# Patient Record
Sex: MALE | Race: WHITE | NOT HISPANIC OR LATINO | Employment: OTHER | URBAN - METROPOLITAN AREA
[De-identification: names, ages, dates, MRNs, and addresses within clinical notes are randomized per-mention and may not be internally consistent; named-entity substitution may affect disease eponyms.]

---

## 2024-02-26 ENCOUNTER — OFFICE VISIT (OUTPATIENT)
Dept: PRIMARY CARE | Facility: CLINIC | Age: 69
End: 2024-02-26
Payer: MEDICARE

## 2024-02-26 VITALS
SYSTOLIC BLOOD PRESSURE: 132 MMHG | TEMPERATURE: 98.4 F | HEIGHT: 70 IN | WEIGHT: 280 LBS | BODY MASS INDEX: 40.09 KG/M2 | RESPIRATION RATE: 18 BRPM | HEART RATE: 68 BPM | DIASTOLIC BLOOD PRESSURE: 78 MMHG

## 2024-02-26 DIAGNOSIS — Z00.00 ROUTINE GENERAL MEDICAL EXAMINATION AT A HEALTH CARE FACILITY: ICD-10-CM

## 2024-02-26 DIAGNOSIS — Z87.891 PAST USE OF TOBACCO: ICD-10-CM

## 2024-02-26 DIAGNOSIS — Z13.6 SCREENING FOR AAA (AORTIC ABDOMINAL ANEURYSM): ICD-10-CM

## 2024-02-26 DIAGNOSIS — Z00.00 ROUTINE GENERAL MEDICAL EXAMINATION AT HEALTH CARE FACILITY: Primary | ICD-10-CM

## 2024-02-26 DIAGNOSIS — D69.6 THROMBOCYTOPENIA (CMS-HCC): ICD-10-CM

## 2024-02-26 DIAGNOSIS — Z12.5 ENCOUNTER FOR SCREENING FOR MALIGNANT NEOPLASM OF PROSTATE: ICD-10-CM

## 2024-02-26 DIAGNOSIS — Z12.11 ENCOUNTER FOR SCREENING FOR MALIGNANT NEOPLASM OF COLON: ICD-10-CM

## 2024-02-26 DIAGNOSIS — K21.9 GASTROESOPHAGEAL REFLUX DISEASE WITHOUT ESOPHAGITIS: ICD-10-CM

## 2024-02-26 DIAGNOSIS — I87.2 CHRONIC STASIS DERMATITIS: ICD-10-CM

## 2024-02-26 DIAGNOSIS — I89.0 LYMPHEDEMA OF BOTH LOWER EXTREMITIES: ICD-10-CM

## 2024-02-26 PROBLEM — Q82.0 HEREDITARY LYMPHEDEMA: Status: RESOLVED | Noted: 2024-02-26 | Resolved: 2024-02-26

## 2024-02-26 PROBLEM — Q82.0 HEREDITARY LYMPHEDEMA: Status: ACTIVE | Noted: 2024-02-26

## 2024-02-26 PROCEDURE — 1160F RVW MEDS BY RX/DR IN RCRD: CPT | Performed by: FAMILY MEDICINE

## 2024-02-26 PROCEDURE — 3008F BODY MASS INDEX DOCD: CPT | Performed by: FAMILY MEDICINE

## 2024-02-26 PROCEDURE — 1170F FXNL STATUS ASSESSED: CPT | Performed by: FAMILY MEDICINE

## 2024-02-26 PROCEDURE — 1036F TOBACCO NON-USER: CPT | Performed by: FAMILY MEDICINE

## 2024-02-26 PROCEDURE — 1158F ADVNC CARE PLAN TLK DOCD: CPT | Performed by: FAMILY MEDICINE

## 2024-02-26 PROCEDURE — G0439 PPPS, SUBSEQ VISIT: HCPCS | Performed by: FAMILY MEDICINE

## 2024-02-26 PROCEDURE — 1159F MED LIST DOCD IN RCRD: CPT | Performed by: FAMILY MEDICINE

## 2024-02-26 PROCEDURE — 1123F ACP DISCUSS/DSCN MKR DOCD: CPT | Performed by: FAMILY MEDICINE

## 2024-02-26 RX ORDER — OMEPRAZOLE 20 MG/1
20 TABLET, DELAYED RELEASE ORAL
COMMUNITY

## 2024-02-26 ASSESSMENT — ACTIVITIES OF DAILY LIVING (ADL)
MANAGING_FINANCES: INDEPENDENT
DOING_HOUSEWORK: INDEPENDENT
BATHING: INDEPENDENT
DRESSING: INDEPENDENT
TAKING_MEDICATION: INDEPENDENT
GROCERY_SHOPPING: INDEPENDENT

## 2024-02-26 ASSESSMENT — PATIENT HEALTH QUESTIONNAIRE - PHQ9
1. LITTLE INTEREST OR PLEASURE IN DOING THINGS: NOT AT ALL
SUM OF ALL RESPONSES TO PHQ9 QUESTIONS 1 AND 2: 0
2. FEELING DOWN, DEPRESSED OR HOPELESS: NOT AT ALL

## 2024-02-26 NOTE — ASSESSMENT & PLAN NOTE
He was treated in the lymphedema clinic with compression which was quite successful.  He is no longer using routine compression and has minimal edema although there is some leftover stasis dermatitis that is probably permanent

## 2024-02-26 NOTE — PROGRESS NOTES
"Subjective   Reason for Visit: Timbo Jean Baptiste is an 68 y.o. male here for a Medicare Wellness visit.     Past Medical, Surgical, and Family History reviewed and updated in chart.    Reviewed all medications by prescribing practitioner or clinical pharmacist (such as prescriptions, OTCs, herbal therapies and supplements) and documented in the medical record.    HPI    Patient Care Team:  Isaiah Huntley MD as PCP - General     Review of Systems    Objective   Vitals:  /78   Pulse 68   Temp 36.9 °C (98.4 °F)   Resp 18   Ht 1.765 m (5' 9.5\")   Wt 127 kg (280 lb)   BMI 40.76 kg/m²       Physical Exam  Constitutional:       Appearance: Normal appearance.   HENT:      Head: Normocephalic.   Eyes:      Conjunctiva/sclera: Conjunctivae normal.   Cardiovascular:      Rate and Rhythm: Normal rate and regular rhythm.      Heart sounds: Normal heart sounds.   Pulmonary:      Effort: Pulmonary effort is normal.      Breath sounds: Normal breath sounds.   Musculoskeletal:      Cervical back: Neck supple.      Right lower le+ Edema present.      Left lower le+ Edema present.   Skin:     General: Skin is warm and dry.   Neurological:      Mental Status: He is alert.         Assessment/Plan   Problem List Items Addressed This Visit       Chronic stasis dermatitis    GERD (gastroesophageal reflux disease)    Lymphedema of both lower extremities    Current Assessment & Plan     He was treated in the lymphedema clinic with compression which was quite successful.  He is no longer using routine compression and has minimal edema although there is some leftover stasis dermatitis that is probably permanent         Thrombocytopenia (CMS/HCC)    Current Assessment & Plan     This has never been symptomatic but has been noted to have mildly low platelets on prior screening blood work.  His last platelet count was 120.  He will continue annual screening         Relevant Orders    CBC and Auto Differential    Body mass " index (BMI) 40.0-44.9, adult (CMS/HCC)     Other Visit Diagnoses       Routine general medical examination at health care facility    -  Primary    Relevant Orders    1 Year Follow Up In [facility] - PCP - Wellness Exam    Encounter for screening for malignant neoplasm of colon        Relevant Orders    Colonoscopy Screening; Average Risk Patient    Screening for AAA (aortic abdominal aneurysm)        Relevant Orders    Vascular US Abdominal Aorta Aneurysm AAA Screening    Past use of tobacco        Relevant Orders    Vascular US Abdominal Aorta Aneurysm AAA Screening    Encounter for screening for malignant neoplasm of prostate        Relevant Orders    Prostate Specific Antigen    Routine general medical examination at a health care facility        Relevant Orders    Hepatitis C Antibody    Comprehensive Metabolic Panel    Lipid Panel        Advanced care planning was discussed with Timbo Jean Baptiste today. We reviewed code status, Medical Power of , and Living will.   He does have a living will and medical power of .  We discussed these documents in detail and he will get copies to be scanned into his chart.    He has a remote history of smoking which is just about 25 years.  We discussed the potential possibility of getting a low-dose lung CT but he did have a lung CT a few years ago which was completely normal.  He is asymptomatic at this point and is declining future lung screenings at this time.  We will however get his annual aortic aneurysm screening scheduled as well as his colonoscopy.  He is up-to-date on immunizations

## 2024-02-26 NOTE — ASSESSMENT & PLAN NOTE
This has never been symptomatic but has been noted to have mildly low platelets on prior screening blood work.  His last platelet count was 120.  He will continue annual screening

## 2024-02-27 ENCOUNTER — LAB (OUTPATIENT)
Dept: LAB | Facility: LAB | Age: 69
End: 2024-02-27
Payer: MEDICARE

## 2024-02-27 DIAGNOSIS — Z00.00 ROUTINE GENERAL MEDICAL EXAMINATION AT A HEALTH CARE FACILITY: ICD-10-CM

## 2024-02-27 DIAGNOSIS — Z12.5 ENCOUNTER FOR SCREENING FOR MALIGNANT NEOPLASM OF PROSTATE: ICD-10-CM

## 2024-02-27 DIAGNOSIS — D69.6 THROMBOCYTOPENIA (CMS-HCC): ICD-10-CM

## 2024-02-27 LAB
ALBUMIN SERPL BCP-MCNC: 4.5 G/DL (ref 3.4–5)
ALP SERPL-CCNC: 62 U/L (ref 33–136)
ALT SERPL W P-5'-P-CCNC: 23 U/L (ref 10–52)
ANION GAP SERPL CALC-SCNC: 12 MMOL/L (ref 10–20)
AST SERPL W P-5'-P-CCNC: 18 U/L (ref 9–39)
BASOPHILS # BLD AUTO: 0.02 X10*3/UL (ref 0–0.1)
BASOPHILS NFR BLD AUTO: 0.5 %
BILIRUB SERPL-MCNC: 0.9 MG/DL (ref 0–1.2)
BUN SERPL-MCNC: 17 MG/DL (ref 6–23)
CALCIUM SERPL-MCNC: 9.2 MG/DL (ref 8.6–10.3)
CHLORIDE SERPL-SCNC: 102 MMOL/L (ref 98–107)
CHOLEST SERPL-MCNC: 182 MG/DL (ref 0–199)
CHOLESTEROL/HDL RATIO: 5.5
CO2 SERPL-SCNC: 28 MMOL/L (ref 21–32)
CREAT SERPL-MCNC: 1.23 MG/DL (ref 0.5–1.3)
EGFRCR SERPLBLD CKD-EPI 2021: 64 ML/MIN/1.73M*2
EOSINOPHIL # BLD AUTO: 0.11 X10*3/UL (ref 0–0.7)
EOSINOPHIL NFR BLD AUTO: 2.9 %
ERYTHROCYTE [DISTWIDTH] IN BLOOD BY AUTOMATED COUNT: 12.3 % (ref 11.5–14.5)
GLUCOSE SERPL-MCNC: 117 MG/DL (ref 74–99)
HCT VFR BLD AUTO: 44.4 % (ref 41–52)
HCV AB SER QL: NONREACTIVE
HDLC SERPL-MCNC: 33 MG/DL
HGB BLD-MCNC: 15.2 G/DL (ref 13.5–17.5)
IMM GRANULOCYTES # BLD AUTO: 0.01 X10*3/UL (ref 0–0.7)
IMM GRANULOCYTES NFR BLD AUTO: 0.3 % (ref 0–0.9)
LDLC SERPL CALC-MCNC: 97 MG/DL
LYMPHOCYTES # BLD AUTO: 1.3 X10*3/UL (ref 1.2–4.8)
LYMPHOCYTES NFR BLD AUTO: 34.5 %
MCH RBC QN AUTO: 33.1 PG (ref 26–34)
MCHC RBC AUTO-ENTMCNC: 34.2 G/DL (ref 32–36)
MCV RBC AUTO: 97 FL (ref 80–100)
MONOCYTES # BLD AUTO: 0.51 X10*3/UL (ref 0.1–1)
MONOCYTES NFR BLD AUTO: 13.5 %
NEUTROPHILS # BLD AUTO: 1.82 X10*3/UL (ref 1.2–7.7)
NEUTROPHILS NFR BLD AUTO: 48.3 %
NON HDL CHOLESTEROL: 149 MG/DL (ref 0–149)
NRBC BLD-RTO: 0 /100 WBCS (ref 0–0)
PLATELET # BLD AUTO: 100 X10*3/UL (ref 150–450)
POTASSIUM SERPL-SCNC: 4.3 MMOL/L (ref 3.5–5.3)
PROT SERPL-MCNC: 6.7 G/DL (ref 6.4–8.2)
PSA SERPL-MCNC: 0.42 NG/ML
RBC # BLD AUTO: 4.59 X10*6/UL (ref 4.5–5.9)
SODIUM SERPL-SCNC: 138 MMOL/L (ref 136–145)
TRIGL SERPL-MCNC: 259 MG/DL (ref 0–149)
VLDL: 52 MG/DL (ref 0–40)
WBC # BLD AUTO: 3.8 X10*3/UL (ref 4.4–11.3)

## 2024-02-27 PROCEDURE — 80053 COMPREHEN METABOLIC PANEL: CPT

## 2024-02-27 PROCEDURE — 80061 LIPID PANEL: CPT

## 2024-02-27 PROCEDURE — 36415 COLL VENOUS BLD VENIPUNCTURE: CPT

## 2024-02-27 PROCEDURE — G0103 PSA SCREENING: HCPCS

## 2024-02-27 PROCEDURE — 86803 HEPATITIS C AB TEST: CPT

## 2024-02-27 PROCEDURE — 85025 COMPLETE CBC W/AUTO DIFF WBC: CPT

## 2024-02-28 DIAGNOSIS — Z12.11 COLON CANCER SCREENING: ICD-10-CM

## 2024-02-29 RX ORDER — POLYETHYLENE GLYCOL 3350, SODIUM CHLORIDE, SODIUM BICARBONATE, POTASSIUM CHLORIDE 420; 11.2; 5.72; 1.48 G/4L; G/4L; G/4L; G/4L
4000 POWDER, FOR SOLUTION ORAL ONCE
Qty: 4000 ML | Refills: 0 | Status: SHIPPED | OUTPATIENT
Start: 2024-02-29 | End: 2024-02-29

## 2024-03-13 ENCOUNTER — OFFICE VISIT (OUTPATIENT)
Dept: PRIMARY CARE | Facility: CLINIC | Age: 69
End: 2024-03-13
Payer: MEDICARE

## 2024-03-13 VITALS
RESPIRATION RATE: 16 BRPM | HEIGHT: 70 IN | TEMPERATURE: 97.7 F | DIASTOLIC BLOOD PRESSURE: 80 MMHG | SYSTOLIC BLOOD PRESSURE: 128 MMHG | HEART RATE: 76 BPM | WEIGHT: 280 LBS | BODY MASS INDEX: 40.09 KG/M2

## 2024-03-13 DIAGNOSIS — D69.6 THROMBOCYTOPENIA (CMS-HCC): Primary | ICD-10-CM

## 2024-03-13 DIAGNOSIS — R73.9 HYPERGLYCEMIA: ICD-10-CM

## 2024-03-13 LAB — POC HEMOGLOBIN A1C: 5.5 % (ref 4.2–6.5)

## 2024-03-13 PROCEDURE — 1123F ACP DISCUSS/DSCN MKR DOCD: CPT | Performed by: FAMILY MEDICINE

## 2024-03-13 PROCEDURE — 99213 OFFICE O/P EST LOW 20 MIN: CPT | Performed by: FAMILY MEDICINE

## 2024-03-13 PROCEDURE — 3008F BODY MASS INDEX DOCD: CPT | Performed by: FAMILY MEDICINE

## 2024-03-13 PROCEDURE — 1036F TOBACCO NON-USER: CPT | Performed by: FAMILY MEDICINE

## 2024-03-13 PROCEDURE — 83036 HEMOGLOBIN GLYCOSYLATED A1C: CPT | Performed by: FAMILY MEDICINE

## 2024-03-13 PROCEDURE — 1160F RVW MEDS BY RX/DR IN RCRD: CPT | Performed by: FAMILY MEDICINE

## 2024-03-13 PROCEDURE — 1159F MED LIST DOCD IN RCRD: CPT | Performed by: FAMILY MEDICINE

## 2024-03-13 NOTE — ASSESSMENT & PLAN NOTE
A review of the record indicates that he has had moderately low platelets for the last 4 years with a current level of 100.  The level is actually higher today than it has been in the past but he has never had severe thrombocytopenia.  This time he also has a very slightly decreased white blood cell count with a normal hemoglobin and hematocrit.  This may simply be etiopathic or autoimmune thrombocytopenia.  He also has a history of alcohol use and is possible he has undiagnosed liver disease however his liver enzymes are all normal.  I will refer him to hematology to pursue additional workup to uncover an underlying etiology of the low platelets and also to evaluate the low white blood cell count

## 2024-03-13 NOTE — PROGRESS NOTES
"Paul Jean Baptiste \"Saul\" is a 68 y.o. male who presents for Follow-up.  HPI      He is here today to discuss recent lab results.  Screening blood work showed a persistently low platelets, a slightly low white blood cell count, and a mildly elevated glucose.  He is not having any symptoms of bleeding now or in the past.    Visit Vitals  /80   Pulse 76   Temp 36.5 °C (97.7 °F)   Resp 16      Objective   Physical Exam  Constitutional:       Appearance: Normal appearance.   HENT:      Head: Normocephalic.   Eyes:      Conjunctiva/sclera: Conjunctivae normal.   Cardiovascular:      Rate and Rhythm: Normal rate and regular rhythm.      Heart sounds: Normal heart sounds.   Pulmonary:      Effort: Pulmonary effort is normal.      Breath sounds: Normal breath sounds.   Musculoskeletal:      Cervical back: Neck supple.   Skin:     General: Skin is warm and dry.   Neurological:      Mental Status: He is alert.       Assessment/Plan      Problem List Items Addressed This Visit       Thrombocytopenia (CMS/HCC) - Primary     A review of the record indicates that he has had moderately low platelets for the last 4 years with a current level of 100.  The level is actually higher today than it has been in the past but he has never had severe thrombocytopenia.  This time he also has a very slightly decreased white blood cell count with a normal hemoglobin and hematocrit.  This may simply be etiopathic or autoimmune thrombocytopenia.  He also has a history of alcohol use and is possible he has undiagnosed liver disease however his liver enzymes are all normal.  I will refer him to hematology to pursue additional workup to uncover an underlying etiology of the low platelets and also to evaluate the low white blood cell count         Relevant Orders    Referral to Benign Hematology     Other Visit Diagnoses       Hyperglycemia        Relevant Orders    POCT glycosylated hemoglobin (Hb A1C) manually resulted (Completed) "        Fortunately the A1c is in the normal range so he does not have diabetes or prediabetes today.

## 2024-03-20 ENCOUNTER — HOSPITAL ENCOUNTER (OUTPATIENT)
Dept: RADIOLOGY | Facility: CLINIC | Age: 69
Discharge: HOME | End: 2024-03-20
Payer: MEDICARE

## 2024-03-20 DIAGNOSIS — Z13.6 SCREENING FOR AAA (AORTIC ABDOMINAL ANEURYSM): ICD-10-CM

## 2024-03-20 DIAGNOSIS — Z87.891 PAST USE OF TOBACCO: ICD-10-CM

## 2024-03-20 PROCEDURE — 76706 US ABDL AORTA SCREEN AAA: CPT | Performed by: RADIOLOGY

## 2024-03-20 PROCEDURE — 76706 US ABDL AORTA SCREEN AAA: CPT

## 2024-04-19 ENCOUNTER — ANESTHESIA EVENT (OUTPATIENT)
Dept: GASTROENTEROLOGY | Facility: EXTERNAL LOCATION | Age: 69
End: 2024-04-19

## 2024-04-29 PROBLEM — J02.9 SORE THROAT: Status: ACTIVE | Noted: 2024-04-29

## 2024-04-29 PROBLEM — E78.5 HYPERLIPIDEMIA: Status: ACTIVE | Noted: 2024-04-29

## 2024-04-29 PROBLEM — M62.81 MUSCLE WEAKNESS: Status: ACTIVE | Noted: 2024-04-29

## 2024-04-29 PROBLEM — M25.673 STIFFNESS OF ANKLE JOINT: Status: ACTIVE | Noted: 2024-04-29

## 2024-04-29 PROBLEM — R06.2 WHEEZING: Status: ACTIVE | Noted: 2024-04-29

## 2024-04-29 PROBLEM — R73.9 HYPERGLYCEMIA: Status: ACTIVE | Noted: 2024-04-29

## 2024-04-29 PROBLEM — R60.0 EDEMA OF LOWER EXTREMITY: Status: ACTIVE | Noted: 2024-04-29

## 2024-04-29 PROBLEM — M79.606 PAIN OF LOWER EXTREMITY: Status: ACTIVE | Noted: 2024-04-29

## 2024-04-29 PROBLEM — R07.89 SENSATION OF CHEST TIGHTNESS: Status: ACTIVE | Noted: 2024-04-29

## 2024-04-30 ENCOUNTER — ANESTHESIA (OUTPATIENT)
Dept: GASTROENTEROLOGY | Facility: EXTERNAL LOCATION | Age: 69
End: 2024-04-30

## 2024-04-30 ENCOUNTER — HOSPITAL ENCOUNTER (OUTPATIENT)
Dept: GASTROENTEROLOGY | Facility: EXTERNAL LOCATION | Age: 69
Discharge: HOME | End: 2024-04-30
Payer: MEDICARE

## 2024-04-30 VITALS
WEIGHT: 280 LBS | SYSTOLIC BLOOD PRESSURE: 144 MMHG | OXYGEN SATURATION: 96 % | TEMPERATURE: 97.7 F | RESPIRATION RATE: 12 BRPM | DIASTOLIC BLOOD PRESSURE: 82 MMHG | BODY MASS INDEX: 40.09 KG/M2 | HEART RATE: 70 BPM | HEIGHT: 70 IN

## 2024-04-30 DIAGNOSIS — Z12.11 ENCOUNTER FOR SCREENING FOR MALIGNANT NEOPLASM OF COLON: Primary | ICD-10-CM

## 2024-04-30 PROCEDURE — 88305 TISSUE EXAM BY PATHOLOGIST: CPT

## 2024-04-30 PROCEDURE — 45385 COLONOSCOPY W/LESION REMOVAL: CPT | Performed by: INTERNAL MEDICINE

## 2024-04-30 PROCEDURE — 88305 TISSUE EXAM BY PATHOLOGIST: CPT | Performed by: STUDENT IN AN ORGANIZED HEALTH CARE EDUCATION/TRAINING PROGRAM

## 2024-04-30 RX ORDER — ONDANSETRON HYDROCHLORIDE 2 MG/ML
4 INJECTION, SOLUTION INTRAVENOUS ONCE AS NEEDED
Status: DISCONTINUED | OUTPATIENT
Start: 2024-04-30 | End: 2024-05-01 | Stop reason: HOSPADM

## 2024-04-30 RX ORDER — LIDOCAINE HYDROCHLORIDE 20 MG/ML
INJECTION, SOLUTION INFILTRATION; PERINEURAL AS NEEDED
Status: DISCONTINUED | OUTPATIENT
Start: 2024-04-30 | End: 2024-04-30

## 2024-04-30 RX ORDER — PROPOFOL 10 MG/ML
INJECTION, EMULSION INTRAVENOUS AS NEEDED
Status: DISCONTINUED | OUTPATIENT
Start: 2024-04-30 | End: 2024-04-30

## 2024-04-30 RX ORDER — SODIUM CHLORIDE 9 MG/ML
20 INJECTION, SOLUTION INTRAVENOUS CONTINUOUS
Status: DISCONTINUED | OUTPATIENT
Start: 2024-04-30 | End: 2024-05-01 | Stop reason: HOSPADM

## 2024-04-30 RX ADMIN — PROPOFOL 50 MG: 10 INJECTION, EMULSION INTRAVENOUS at 07:53

## 2024-04-30 RX ADMIN — PROPOFOL 100 MG: 10 INJECTION, EMULSION INTRAVENOUS at 07:35

## 2024-04-30 RX ADMIN — PROPOFOL 50 MG: 10 INJECTION, EMULSION INTRAVENOUS at 07:50

## 2024-04-30 RX ADMIN — PROPOFOL 50 MG: 10 INJECTION, EMULSION INTRAVENOUS at 07:39

## 2024-04-30 RX ADMIN — PROPOFOL 50 MG: 10 INJECTION, EMULSION INTRAVENOUS at 07:46

## 2024-04-30 RX ADMIN — LIDOCAINE HYDROCHLORIDE 60 MG: 20 INJECTION, SOLUTION INFILTRATION; PERINEURAL at 07:35

## 2024-04-30 RX ADMIN — PROPOFOL 50 MG: 10 INJECTION, EMULSION INTRAVENOUS at 07:42

## 2024-04-30 RX ADMIN — PROPOFOL 50 MG: 10 INJECTION, EMULSION INTRAVENOUS at 07:57

## 2024-04-30 SDOH — HEALTH STABILITY: MENTAL HEALTH: CURRENT SMOKER: 0

## 2024-04-30 ASSESSMENT — PAIN SCALES - GENERAL
PAIN_LEVEL: 0
PAINLEVEL_OUTOF10: 0 - NO PAIN

## 2024-04-30 ASSESSMENT — PAIN - FUNCTIONAL ASSESSMENT
PAIN_FUNCTIONAL_ASSESSMENT: 0-10

## 2024-04-30 ASSESSMENT — COLUMBIA-SUICIDE SEVERITY RATING SCALE - C-SSRS
1. IN THE PAST MONTH, HAVE YOU WISHED YOU WERE DEAD OR WISHED YOU COULD GO TO SLEEP AND NOT WAKE UP?: NO
2. HAVE YOU ACTUALLY HAD ANY THOUGHTS OF KILLING YOURSELF?: NO
6. HAVE YOU EVER DONE ANYTHING, STARTED TO DO ANYTHING, OR PREPARED TO DO ANYTHING TO END YOUR LIFE?: NO

## 2024-04-30 NOTE — ANESTHESIA POSTPROCEDURE EVALUATION
"Patient: Timbo Jean Baptiste \"Saul\"    Procedure Summary       Date: 04/30/24 Room / Location: [facility]    Anesthesia Start: 0731 Anesthesia Stop:     Procedure: COLONOSCOPY Diagnosis: Encounter for screening for malignant neoplasm of colon    Scheduled Providers: Oscar Wall MD Responsible Provider: GEORGETTE Flor    Anesthesia Type: MAC ASA Status: 3            Anesthesia Type: MAC    Vitals Value Taken Time   /63 04/30/24 0802   Temp 36.5 °C (97.7 °F) 04/30/24 0802   Pulse 74 04/30/24 0802   Resp 17 04/30/24 0802   SpO2 97 % 04/30/24 0802       Anesthesia Post Evaluation    Patient location during evaluation: bedside  Patient participation: complete - patient cannot participate  Level of consciousness: awake and responsive to verbal stimuli  Pain score: 0  Pain management: adequate  Airway patency: patent  Cardiovascular status: acceptable and hemodynamically stable  Respiratory status: acceptable  Hydration status: acceptable  Postoperative Nausea and Vomiting: none        No notable events documented.    "

## 2024-04-30 NOTE — ANESTHESIA PREPROCEDURE EVALUATION
"Patient: Timbo Jean Baptiste \"Saul\"    Procedure Information       Date/Time: 04/30/24 0730    Scheduled providers: Oscar Wall MD    Procedure: COLONOSCOPY    Location: [facility]            Relevant Problems   Cardiac   (+) Hyperlipidemia      GI   (+) GERD (gastroesophageal reflux disease)      Hematology   (+) Thrombocytopenia (CMS-HCC)       Clinical information reviewed:                   NPO Detail:  No data recorded     Physical Exam    Airway  Mallampati: II  TM distance: >3 FB  Neck ROM: full     Cardiovascular - normal exam     Dental - normal exam     Pulmonary - normal exam  Breath sounds clear to auscultation     Abdominal            Anesthesia Plan    History of general anesthesia?: yes  History of complications of general anesthesia?: no    ASA 3     MAC     The patient is not a current smoker.    intravenous induction   Anesthetic plan and risks discussed with patient.    Plan discussed with CRNA.      "

## 2024-04-30 NOTE — H&P
Outpatient Hospital Procedure H&P    Patient Profile-Procedures  Initial Info  Patient Demographics  Name Timbo Jean Baptiste  Date of Birth 1955  MRN [id number]  Address   [id number] [place] [id number] [facility] [address]    Primary Phone Number [phone]  Secondary Phone Number    Isaiah Mariano    Procedure(s):  Colonoscopy    Primary contact name and number   Extended Emergency Contact Information  Primary Emergency Contact: Shelbi Meza  Address: [address] [place] [id number] [facility]  Home Phone: [phone]  Mobile Phone: [phone]  Relation: Spouse    General Health  Weight   Vitals:    04/30/24 0723   Weight: 127 kg (280 lb)     BMI Body mass index is 40.76 kg/m².    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    Low HDL (under 40)    Localized edema 10/18/2016    Bilateral leg edema    Other specified abnormal findings of blood chemistry 10/24/2016    High thyroid stimulating hormone (TSH) level    Personal history of other specified conditions 12/16/2016    History of epistaxis    Visual impairment 02/2022    Vitamin D deficiency, unspecified 08/15/2019    Vitamin D deficiency    Vitamin D deficiency, unspecified 08/15/2019    Vitamin D deficiency    Vitamin D deficiency, unspecified 08/15/2019    Vitamin D deficiency       Provider assessment  Diagnosis: Screening    Medication Reviewed - yes  Prior to Admission medications    Medication Sig Start Date End Date Taking? Authorizing Provider   omeprazole OTC (PriLOSEC OTC) 20 mg EC tablet Take 1 tablet (20 mg) by mouth once daily in the morning. Take before meals. Do not crush, chew, or split.   Yes Historical Provider, MD       Physical Exam  Vitals:    04/30/24 0723   BP: (!) 161/92   Pulse: 68   Resp: 16   Temp: 36.3 °C (97.3 °F)   SpO2: 96%         General: A&Ox3, NAD.  CV: RRR. No murmur.  Resp: CTA bilaterally. No wheezing, rhonchi or rales.   Extrem: No edema.       Oropharyngeal Classification II (hard and soft palate, upper portion of tonsils and uvula visible)  ASA PS Classification 3  Sedation Plan Deep  Procedure Plan - pre-procedural (re)assesment completed by physician:  discharge/transfer patient when discharge criteria met    Oscar Wall MD  4/30/2024 7:31 AM

## 2024-04-30 NOTE — DISCHARGE INSTRUCTIONS
Patient Instructions Post Procedure      The anesthetics, sedatives or narcotics which were given to you today will be acting in your body for the next 24 hours, so you might feel a little sleepy or groggy.  This feeling should slowly wear off. Carefully read and follow the instructions.     You received sedation today:  - Do not drive or operate any machinery or power tools of any kind.   - No alcoholic beverages today, not even beer or wine.  - Do not make any important decisions or sign any legal documents.  - No over the counter medications that contain alcohol or that may cause drowsiness.    While it is common to experience mild to moderate abdominal distention, gas, or belching after your procedure, if any of these symptoms occur following discharge from the GI Lab or within one week of having your procedure, call the [facility] to be advised whether a visit to your nearest Urgent Care or Emergency Department is indicated.  Take this paper with you if you go.   - If you develop an allergic reaction to the medications that were given during your procedure such as difficulty breathing, rash, hives, severe nausea, vomiting or lightheadedness.  - If you experience chest pain, shortness of breath, severe abdominal pain, fevers and chills.  -If you develop signs and symptoms of bleeding such as blood in your spit, if your stools turn black, tarry, or bloody  - If you have not urinated within 8 hours following your procedure.  - If your IV site becomes painful, red, inflamed, or looks infected.    If you received a biopsy/polypectomy/sphincterotomy the following instructions apply below:  __ Do not use Aspirin containing products, non-steroidal medications or anti-coagulants for one week following your procedure. (Examples of these types of medications are: Advil, Arthrotec, Aleve, Coumadin, Ecotrin, Heparin, Ibuprofen, Indocin, Motrin, Naprosyn, Nuprin, Plavix, Vioxx, and Voltarin, or their generic  forms.  This list is not all-inclusive.  Check with your physician or pharmacist before resuming medications.)   __ Eat a soft diet today.  Avoid foods that are poorly digested for the next 24 hours.  These foods would include: nuts, beans, lettuce, red meats, and fried foods. Start with liquids and advance your diet as tolerated, gradually work up to eating solids.   __ Do not have a Barium Study or Enema for one week.    Your physician recommends the additional following instructions:    -You have a contact number available for emergencies. The signs and symptoms of potential delayed complications were discussed with you. You may return to normal activities tomorrow.  -Resume your previous diet or other if specified.  -Continue your present medications.   -We are waiting for your pathology results, if applicable.  -The findings and recommendations have been discussed with you and/or family.  - Please see Medication Reconciliation Form for new medication/medications prescribed.     If you experience any problems or have any questions following discharge from the GI Lab, please call: [phone] from 7 am- 4:30 pm.  In the event of an emergency please go to the closest Emergency Department or call Dr. Wall at [phone]

## 2024-05-08 LAB
LABORATORY COMMENT REPORT: NORMAL
PATH REPORT.FINAL DX SPEC: NORMAL
PATH REPORT.GROSS SPEC: NORMAL
PATH REPORT.TOTAL CANCER: NORMAL

## 2024-07-03 ENCOUNTER — OFFICE VISIT (OUTPATIENT)
Dept: HEMATOLOGY/ONCOLOGY | Facility: CLINIC | Age: 69
End: 2024-07-03
Payer: MEDICARE

## 2024-07-03 ENCOUNTER — LAB (OUTPATIENT)
Dept: LAB | Facility: CLINIC | Age: 69
End: 2024-07-03
Payer: MEDICARE

## 2024-07-03 VITALS
WEIGHT: 283.07 LBS | OXYGEN SATURATION: 96 % | RESPIRATION RATE: 16 BRPM | HEIGHT: 69 IN | DIASTOLIC BLOOD PRESSURE: 80 MMHG | BODY MASS INDEX: 41.93 KG/M2 | SYSTOLIC BLOOD PRESSURE: 130 MMHG | TEMPERATURE: 97.7 F | HEART RATE: 71 BPM

## 2024-07-03 DIAGNOSIS — D72.819 LEUKOPENIA, UNSPECIFIED TYPE: Primary | ICD-10-CM

## 2024-07-03 DIAGNOSIS — D69.6 THROMBOCYTOPENIA (CMS-HCC): ICD-10-CM

## 2024-07-03 DIAGNOSIS — D72.819 LEUKOPENIA, UNSPECIFIED TYPE: ICD-10-CM

## 2024-07-03 LAB
ALBUMIN SERPL BCP-MCNC: 4.4 G/DL (ref 3.4–5)
ALP SERPL-CCNC: 61 U/L (ref 33–136)
ALT SERPL W P-5'-P-CCNC: 18 U/L (ref 10–52)
ANION GAP SERPL CALC-SCNC: 12 MMOL/L (ref 10–20)
APTT PPP: 32 SECONDS (ref 27–38)
AST SERPL W P-5'-P-CCNC: 17 U/L (ref 9–39)
B2 GLYCOPROT1 IGA SER-ACNC: <0.6 U/ML
B2 GLYCOPROT1 IGG SER-ACNC: <1.4 U/ML
B2 GLYCOPROT1 IGM SER-ACNC: <0.2 U/ML
BASOPHILS # BLD AUTO: 0.02 X10*3/UL (ref 0–0.1)
BASOPHILS NFR BLD AUTO: 0.6 %
BILIRUB SERPL-MCNC: 1.1 MG/DL (ref 0–1.2)
BUN SERPL-MCNC: 20 MG/DL (ref 6–23)
CALCIUM SERPL-MCNC: 9.4 MG/DL (ref 8.6–10.3)
CARDIOLIPIN IGA SERPL-ACNC: 0.5 APL U/ML
CARDIOLIPIN IGG SER IA-ACNC: <1.6 GPL U/ML
CARDIOLIPIN IGM SER IA-ACNC: <0.2 MPL U/ML
CHLORIDE SERPL-SCNC: 100 MMOL/L (ref 98–107)
CMV IGG AVIDITY SERPL IA-RTO: NONREACTIVE %
CO2 SERPL-SCNC: 29 MMOL/L (ref 21–32)
CREAT SERPL-MCNC: 1.15 MG/DL (ref 0.5–1.3)
CRP SERPL-MCNC: 0.8 MG/DL
EBV EA IGG SER QL: NEGATIVE
EBV NA AB SER QL: POSITIVE
EBV VCA IGG SER IA-ACNC: POSITIVE
EBV VCA IGM SER IA-ACNC: NEGATIVE
EGFRCR SERPLBLD CKD-EPI 2021: 69 ML/MIN/1.73M*2
EOSINOPHIL # BLD AUTO: 0.08 X10*3/UL (ref 0–0.7)
EOSINOPHIL NFR BLD AUTO: 2.3 %
ERYTHROCYTE [DISTWIDTH] IN BLOOD BY AUTOMATED COUNT: 12.2 % (ref 11.5–14.5)
ERYTHROCYTE [SEDIMENTATION RATE] IN BLOOD BY WESTERGREN METHOD: 5 MM/H (ref 0–20)
FIBRINOGEN PPP-MCNC: 341 MG/DL (ref 200–400)
FOLATE SERPL-MCNC: 16.6 NG/ML
GLUCOSE SERPL-MCNC: 125 MG/DL (ref 74–99)
HBV CORE IGM SER QL: NONREACTIVE
HBV SURFACE AG SERPL QL IA: NONREACTIVE
HCT VFR BLD AUTO: 40.5 % (ref 41–52)
HGB BLD-MCNC: 14.1 G/DL (ref 13.5–17.5)
HIV 1+2 AB+HIV1 P24 AG SERPL QL IA: NONREACTIVE
IGA SERPL-MCNC: 67 MG/DL (ref 70–400)
IGG SERPL-MCNC: 826 MG/DL (ref 700–1600)
IGM SERPL-MCNC: 48 MG/DL (ref 40–230)
IMM GRANULOCYTES # BLD AUTO: 0.01 X10*3/UL (ref 0–0.7)
IMM GRANULOCYTES NFR BLD AUTO: 0.3 % (ref 0–0.9)
INR PPP: 1.1 (ref 0.9–1.1)
LDH SERPL L TO P-CCNC: 162 U/L (ref 84–246)
LYMPHOCYTES # BLD AUTO: 0.79 X10*3/UL (ref 1.2–4.8)
LYMPHOCYTES NFR BLD AUTO: 23.1 %
MCH RBC QN AUTO: 32.8 PG (ref 26–34)
MCHC RBC AUTO-ENTMCNC: 34.8 G/DL (ref 32–36)
MCV RBC AUTO: 94 FL (ref 80–100)
MONOCYTES # BLD AUTO: 0.38 X10*3/UL (ref 0.1–1)
MONOCYTES NFR BLD AUTO: 11.1 %
NEUTROPHILS # BLD AUTO: 2.14 X10*3/UL (ref 1.2–7.7)
NEUTROPHILS NFR BLD AUTO: 62.6 %
PLATELET # BLD AUTO: 89 X10*3/UL (ref 150–450)
POTASSIUM SERPL-SCNC: 4 MMOL/L (ref 3.5–5.3)
PROT SERPL-MCNC: 6.4 G/DL (ref 6.4–8.2)
PROT SERPL-MCNC: 6.7 G/DL (ref 6.4–8.2)
PROTHROMBIN TIME: 11.9 SECONDS (ref 9.8–12.8)
RBC # BLD AUTO: 4.3 X10*6/UL (ref 4.5–5.9)
RHEUMATOID FACT SER NEPH-ACNC: <10 IU/ML (ref 0–15)
SODIUM SERPL-SCNC: 137 MMOL/L (ref 136–145)
VIT B12 SERPL-MCNC: 235 PG/ML (ref 211–911)
WBC # BLD AUTO: 3.4 X10*3/UL (ref 4.4–11.3)

## 2024-07-03 PROCEDURE — 82746 ASSAY OF FOLIC ACID SERUM: CPT | Performed by: PHYSICIAN ASSISTANT

## 2024-07-03 PROCEDURE — 85384 FIBRINOGEN ACTIVITY: CPT | Performed by: PHYSICIAN ASSISTANT

## 2024-07-03 PROCEDURE — 86147 CARDIOLIPIN ANTIBODY EA IG: CPT | Performed by: PHYSICIAN ASSISTANT

## 2024-07-03 PROCEDURE — 83521 IG LIGHT CHAINS FREE EACH: CPT | Performed by: PHYSICIAN ASSISTANT

## 2024-07-03 PROCEDURE — 1126F AMNT PAIN NOTED NONE PRSNT: CPT | Performed by: PHYSICIAN ASSISTANT

## 2024-07-03 PROCEDURE — 86023 IMMUNOGLOBULIN ASSAY: CPT

## 2024-07-03 PROCEDURE — 82607 VITAMIN B-12: CPT | Performed by: PHYSICIAN ASSISTANT

## 2024-07-03 PROCEDURE — 99214 OFFICE O/P EST MOD 30 MIN: CPT | Performed by: PHYSICIAN ASSISTANT

## 2024-07-03 PROCEDURE — 86146 BETA-2 GLYCOPROTEIN ANTIBODY: CPT | Performed by: PHYSICIAN ASSISTANT

## 2024-07-03 PROCEDURE — 1036F TOBACCO NON-USER: CPT | Performed by: PHYSICIAN ASSISTANT

## 2024-07-03 PROCEDURE — 84155 ASSAY OF PROTEIN SERUM: CPT | Mod: 59 | Performed by: PHYSICIAN ASSISTANT

## 2024-07-03 PROCEDURE — 86665 EPSTEIN-BARR CAPSID VCA: CPT | Performed by: PHYSICIAN ASSISTANT

## 2024-07-03 PROCEDURE — 83010 ASSAY OF HAPTOGLOBIN QUANT: CPT | Performed by: PHYSICIAN ASSISTANT

## 2024-07-03 PROCEDURE — 86644 CMV ANTIBODY: CPT | Performed by: PHYSICIAN ASSISTANT

## 2024-07-03 PROCEDURE — 1123F ACP DISCUSS/DSCN MKR DOCD: CPT | Performed by: PHYSICIAN ASSISTANT

## 2024-07-03 PROCEDURE — 86038 ANTINUCLEAR ANTIBODIES: CPT | Performed by: PHYSICIAN ASSISTANT

## 2024-07-03 PROCEDURE — 87389 HIV-1 AG W/HIV-1&-2 AB AG IA: CPT | Performed by: PHYSICIAN ASSISTANT

## 2024-07-03 PROCEDURE — 85613 RUSSELL VIPER VENOM DILUTED: CPT | Performed by: PHYSICIAN ASSISTANT

## 2024-07-03 PROCEDURE — 1159F MED LIST DOCD IN RCRD: CPT | Performed by: PHYSICIAN ASSISTANT

## 2024-07-03 PROCEDURE — 86431 RHEUMATOID FACTOR QUANT: CPT | Performed by: PHYSICIAN ASSISTANT

## 2024-07-03 PROCEDURE — 3008F BODY MASS INDEX DOCD: CPT | Performed by: PHYSICIAN ASSISTANT

## 2024-07-03 PROCEDURE — 82784 ASSAY IGA/IGD/IGG/IGM EACH: CPT | Performed by: PHYSICIAN ASSISTANT

## 2024-07-03 PROCEDURE — 85652 RBC SED RATE AUTOMATED: CPT | Performed by: PHYSICIAN ASSISTANT

## 2024-07-03 PROCEDURE — 86663 EPSTEIN-BARR ANTIBODY: CPT | Performed by: PHYSICIAN ASSISTANT

## 2024-07-03 PROCEDURE — 88189 FLOWCYTOMETRY/READ 16 & >: CPT | Performed by: PHYSICIAN ASSISTANT

## 2024-07-03 PROCEDURE — 88185 FLOWCYTOMETRY/TC ADD-ON: CPT | Mod: TC | Performed by: PHYSICIAN ASSISTANT

## 2024-07-03 PROCEDURE — 86140 C-REACTIVE PROTEIN: CPT | Performed by: PHYSICIAN ASSISTANT

## 2024-07-03 PROCEDURE — 85610 PROTHROMBIN TIME: CPT | Performed by: PHYSICIAN ASSISTANT

## 2024-07-03 PROCEDURE — 87340 HEPATITIS B SURFACE AG IA: CPT | Performed by: PHYSICIAN ASSISTANT

## 2024-07-03 PROCEDURE — 84165 PROTEIN E-PHORESIS SERUM: CPT | Performed by: PHYSICIAN ASSISTANT

## 2024-07-03 PROCEDURE — 85730 THROMBOPLASTIN TIME PARTIAL: CPT | Performed by: PHYSICIAN ASSISTANT

## 2024-07-03 PROCEDURE — 81450 HL NEO GSAP 5-50DNA/DNA&RNA: CPT | Performed by: PHYSICIAN ASSISTANT

## 2024-07-03 PROCEDURE — 84075 ASSAY ALKALINE PHOSPHATASE: CPT

## 2024-07-03 PROCEDURE — 84630 ASSAY OF ZINC: CPT

## 2024-07-03 PROCEDURE — 99204 OFFICE O/P NEW MOD 45 MIN: CPT | Performed by: PHYSICIAN ASSISTANT

## 2024-07-03 PROCEDURE — 83615 LACTATE (LD) (LDH) ENZYME: CPT | Performed by: PHYSICIAN ASSISTANT

## 2024-07-03 PROCEDURE — 82525 ASSAY OF COPPER: CPT

## 2024-07-03 PROCEDURE — 86645 CMV ANTIBODY IGM: CPT

## 2024-07-03 PROCEDURE — 86664 EPSTEIN-BARR NUCLEAR ANTIGEN: CPT | Performed by: PHYSICIAN ASSISTANT

## 2024-07-03 PROCEDURE — 86705 HEP B CORE ANTIBODY IGM: CPT | Performed by: PHYSICIAN ASSISTANT

## 2024-07-03 PROCEDURE — 85025 COMPLETE CBC W/AUTO DIFF WBC: CPT

## 2024-07-03 PROCEDURE — 36415 COLL VENOUS BLD VENIPUNCTURE: CPT

## 2024-07-03 PROCEDURE — 1160F RVW MEDS BY RX/DR IN RCRD: CPT | Performed by: PHYSICIAN ASSISTANT

## 2024-07-03 ASSESSMENT — ENCOUNTER SYMPTOMS
NAUSEA: 0
ENDOCRINE NEGATIVE: 1
SORE THROAT: 0
CHEST TIGHTNESS: 0
LIGHT-HEADEDNESS: 0
DIARRHEA: 0
CHILLS: 0
APPETITE CHANGE: 0
LEG SWELLING: 1
PSYCHIATRIC NEGATIVE: 1
PALPITATIONS: 0
ADENOPATHY: 0
FATIGUE: 0
ABDOMINAL PAIN: 0
FEVER: 0
COUGH: 0
BLOOD IN STOOL: 0
DIZZINESS: 0
SHORTNESS OF BREATH: 0
ARTHRALGIAS: 0
BRUISES/BLEEDS EASILY: 0
HEMATURIA: 0
UNEXPECTED WEIGHT CHANGE: 0
MYALGIAS: 0
EYES NEGATIVE: 1
VOMITING: 0
CONSTIPATION: 0

## 2024-07-03 ASSESSMENT — PAIN SCALES - GENERAL: PAINLEVEL: 0-NO PAIN

## 2024-07-03 NOTE — PROGRESS NOTES
"Patient ID: Timbo Jean Baptiste \"Saul\" is a 68 y.o. male.  Referring Physician: Isaiah Huntley MD  [id number] [id number]  Primary Care Provider: Isaiah Huntley MD  Visit Type: Initial Visit    Location: [facility]  Diagnosis/Reason:   Thrombocytopenia  Leukopenia    HPI:  Timbo Jean Baptiste is a 68 y.o. male referred for consultation of thrombocytopenia & leukopenia.     NOTE:  7/3/24 - Patient has medical history significant for chronic stasis dermatitis, GERD.    Per review of records:  Platelets low dating back to Aug of 2019 ranging from .   Leukocytopenia in Feb 2022 and 2024 - 4.3 and 3.8   RBCs, hemoglobin and hematocrit and differentials WNL   Hep C negative   CMP WNL     Previous Hematological Background:  Hx of hematological disorders: No - Patient denies prior hematologic history Told by PCP in the past, about 4-5 years ago that his platelets were low. Monitored- never worked up.    Hx of blood transfusions: No - Patient denies prior blood transfusion  Hx of iron supplementation: No - Denies any previous supplementation  Hx of B12 supplementation: No - Denies any prior supplementation  Hx of folate supplementation: No - Denies any prior supplementation    Relevant medications:  Omeprazole PPI only   Currently using NSAID's (Naproxen, Ibuprofen etc.): Rarely  Currently taking any supplements: Denies    Feels well. No concerns. Some black stools periodically - recently has colonoscopy. Epitaxis from left nares in the winter - takes about 5 mins to clot, has has it cauterized in the past. No other bleeding. Has right upper abd cramping pain, describes as MSK pain, happens a couple times a week, relief with rest. He has had lymphedema in her bilateral lower extremities for years, has gone to PT, uses pumps and compression socks.     Patient denies weight loss, abnormal bruising and bleeding, hematuria, blood in stool, oral/gingival bleeding, lymphadenopathy, recurrent " "infections, recurrent fevers, night sweats, early satiety, abdominal pain, bone pain, chest pain, palpitations, SOB, TRINIDAD, fatigue, dizziness, lightheadedness, PICA.    PMHx:  Active Ambulatory Problems     Diagnosis Date Noted    Chronic stasis dermatitis 02/26/2024    GERD (gastroesophageal reflux disease) 02/26/2024    Lymphedema of both lower extremities 02/26/2024    Thrombocytopenia (CMS-HCC) 02/26/2024    Body mass index (BMI) 40.0-44.9, adult (Multi) 02/26/2024    Hyperglycemia 04/29/2024    Hyperlipidemia 04/29/2024    Edema of lower extremity 04/29/2024    Muscle weakness 04/29/2024    Pain of lower extremity 04/29/2024    Sensation of chest tightness 04/29/2024    Sore throat 04/29/2024    Stiffness of ankle joint 04/29/2024    Wheezing 04/29/2024     Resolved Ambulatory Problems     Diagnosis Date Noted    Hereditary lymphedema 02/26/2024     Past Medical History:   Diagnosis Date    Cataract 09/2022    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   Father: prostate cancer     Social Hx:  Timbo Jean Baptiste \"Saul\"    reports that he quit smoking about 23 years ago. His smoking use included cigarettes. He started smoking about 49 years ago. He has a 26 pack-year smoking history. He has never used smokeless tobacco.  He  reports current alcohol use of about 4.0 standard drinks of alcohol per week.  He  reports no history of drug use.  Social History "     Socioeconomic History    Marital status:      Spouse name: None    Number of children: None    Years of education: None    Highest education level: None   Occupational History    None   Tobacco Use    Smoking status: Former     Current packs/day: 0.00     Average packs/day: 1 pack/day for 26.0 years (26.0 ttl pk-yrs)     Types: Cigarettes     Start date: 1974     Quit date: 2000     Years since quittin.9    Smokeless tobacco: Never   Vaping Use    Vaping status: Never Used   Substance and Sexual Activity    Alcohol use: Yes     Alcohol/week: 4.0 standard drinks of alcohol     Types: 4 Cans of beer per week    Drug use: Never    Sexual activity: Not Currently     Partners: Female     Birth control/protection: Abstinence   Other Topics Concern    None   Social History Narrative    None     Social Determinants of Health     Financial Resource Strain: Not on file   Food Insecurity: Not on file   Transportation Needs: Not on file   Physical Activity: Not on file   Stress: Not on file   Social Connections: Not on file   Intimate Partner Violence: Not on file   Housing Stability: Not on file      Living Situation: with spouse   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    ROS:  Review of Systems   Constitutional:  Negative for appetite change, chills, fatigue,  "fever and unexpected weight change.   HENT:   Positive for nosebleeds. Negative for lump/mass and sore throat.    Eyes: Negative.    Respiratory:  Negative for chest tightness, cough and shortness of breath.    Cardiovascular:  Positive for leg swelling. Negative for chest pain and palpitations.   Gastrointestinal:  Negative for abdominal pain, blood in stool, constipation, diarrhea, nausea and vomiting.   Endocrine: Negative.    Genitourinary:  Negative for hematuria.    Musculoskeletal:  Negative for arthralgias and myalgias.   Skin: Negative.    Neurological:  Negative for dizziness and light-headedness.   Hematological:  Negative for adenopathy. Does not bruise/bleed easily.   Psychiatric/Behavioral: Negative.        10 point review of systems negative except as state in HPI.    Vitals & Statistics:  Objective   BSA: 2.5 meters squared  /80 (BP Location: Right arm, Patient Position: Sitting, BP Cuff Size: Adult)   Pulse 71   Temp 36.5 °C (97.7 °F) (Temporal)   Resp 16   Ht 1.763 m (5' 9.41\")   Wt 128 kg (283 lb 1.1 oz)   SpO2 96%   BMI 41.31 kg/m²     Physical Exam:  Physical Exam  Vitals reviewed.   Constitutional:       General: He is not in acute distress.     Appearance: He is obese. He is not toxic-appearing.   HENT:      Head: Normocephalic and atraumatic.      Nose: Nose normal.      Mouth/Throat:      Mouth: Mucous membranes are moist.      Pharynx: Oropharynx is clear.   Eyes:      Pupils: Pupils are equal, round, and reactive to light.   Cardiovascular:      Rate and Rhythm: Normal rate and regular rhythm.      Pulses: Normal pulses.      Heart sounds: Normal heart sounds. No murmur heard.  Pulmonary:      Effort: Pulmonary effort is normal. No respiratory distress.      Breath sounds: Normal breath sounds.   Abdominal:      General: Bowel sounds are normal. There is no distension.      Palpations: Abdomen is soft. There is no mass.      Tenderness: There is no abdominal tenderness. There " "is no guarding.   Musculoskeletal:         General: Normal range of motion.      Cervical back: Normal range of motion and neck supple.      Right lower leg: Edema present.      Left lower leg: Edema present.   Lymphadenopathy:      Head:      Right side of head: No submental, submandibular, tonsillar, preauricular or posterior auricular adenopathy.      Left side of head: No submental, submandibular, tonsillar, preauricular or posterior auricular adenopathy.      Cervical: No cervical adenopathy.      Right cervical: No superficial or posterior cervical adenopathy.     Left cervical: No superficial or posterior cervical adenopathy.      Upper Body:      Right upper body: No supraclavicular or axillary adenopathy.      Left upper body: No supraclavicular or axillary adenopathy.   Skin:     General: Skin is warm and dry.      Capillary Refill: Capillary refill takes less than 2 seconds.   Neurological:      General: No focal deficit present.      Mental Status: He is alert and oriented to person, place, and time.   Psychiatric:         Mood and Affect: Mood normal.         Behavior: Behavior normal.         Thought Content: Thought content normal.         Judgment: Judgment normal.       Results:  Lab Results   Component Value Date    WBC 3.8 (L) 02/27/2024    NEUTROABS 1.82 02/27/2024    IGABSOL 0.01 02/27/2024    LYMPHSABS 1.30 02/27/2024    MONOSABS 0.51 02/27/2024    EOSABS 0.11 02/27/2024    BASOSABS 0.02 02/27/2024    RBC 4.59 02/27/2024    MCV 97 02/27/2024    MCHC 34.2 02/27/2024    HGB 15.2 02/27/2024    HCT 44.4 02/27/2024     (L) 02/27/2024     No results found for: \"RETICCTPCT\"   Lab Results   Component Value Date    CREATININE 1.23 02/27/2024    BUN 17 02/27/2024    EGFR 64 02/27/2024     02/27/2024    K 4.3 02/27/2024     02/27/2024    CO2 28 02/27/2024      Lab Results   Component Value Date    ALT 23 02/27/2024    AST 18 02/27/2024    ALKPHOS 62 02/27/2024    BILITOT 0.9 02/27/2024 " "     Lab Results   Component Value Date    TSH 3.51 2019     Lab Results   Component Value Date    TSH 3.51 2019     No results found for: \"IRON\", \"TIBC\", \"FERRITIN\"   No results found for: \"HBRJFSCA08\"   No results found for: \"FOLATE\"  No results found for: \"OLGA\", \"RF\", \"SEDRATE\"   No results found for: \"CRP\"   No results found for: \"KAMI\"  No results found for: \"LDH\"  No results found for: \"HAPTOGLOBIN\"  No results found for: \"SPEP\"  No results found for: \"IGG\", \"IGM\", \"IGA\"  Lab Results   Component Value Date    HEPCAB Nonreactive 2024     No results found for: \"HIV1X2\"    Assessment:  Timbo Jean Baptiste is a 68 y.o. male referred for consultation of thrombocytopenia & leukopenia.     He is asymptomatic w/ unremarkable physical exam    I reviewed patient's chart including but not limited to labs, imaging, surgical/procedure notes, pathology, hospital notes, doctor's notes.    Relevant historical labs/imagin24  HCV: Negative    7/3/24 results:  Leukopenia at 3.4 - Lymphopenia at 0.79  NC/NC erythrocytopenia w/ RBC count at 4.30 - Hct low, Hb WNL - RDW WNL  Thrombocytopenia at 89K  Remaining results pending at time of writing    Plan:  Discussed seeking medical attention if became sick d/t low wbc counts. To call us sooner for appt if starts getting B symptoms, recurrent infections or lumps.  Discussed possible etiologies of leukopenia including: vitamin deficiency, autoimmune disease, infection, inflammatory disorder, allergies, chronic benign leukopenia, malignancy, etc.   Discussed possible etiologies of thrombocytopenia including: vitamin deficiency, enlarged spleen/liver, autoimmune disease, infection, inflammatory disorder, allergies, collection tube clumping, etc. Will start hematological workup today.    1) Leukopenia  2) Thrombocytopenia  Lab results pending - Will review when available and address adverse results as needed  RTC in 3 weeks via in-person visit - F/U sooner if " needed/urgent    I had an extensive discussion with the patient regarding the diagnosis and discussed the plan of therapy, including general considerations regarding side effects and outcomes. Pt understood and gave appropriate teach back about the plan of care. All questions were answered to the patient's satisfaction. The patient is instructed to contact us at any time if questions or problems arise. Thank you for the opportunity to participate in the care of this very pleasant patient.    Total time = 80 minutes. 50% or more of this time was spent in counseling and/or coordination of care including reviewing medical history/radiology/labs, examining patient, formulating outlined plan with team, and discussing plan with patient/family.    Shankar De Leon PA-C

## 2024-07-05 LAB
CELL COUNT (BLOOD): 3.4 X10*3/UL
CELL POPULATIONS: NORMAL
DIAGNOSIS: NORMAL
DRVVT SCREEN TO CONFIRM RATIO: 1.38 RATIO
DRVVT/DRVVT CFM NRMLZD PPP-RTO: 1.03 RATIO
DRVVT/DRVVT CFM P DOAC NEUT NORM PPP-RTO: 1.33 RATIO
FLOW DIFFERENTIAL: NORMAL
FLOW TEST ORDERED: NORMAL
HAPTOGLOB SERPL-MCNC: 140 MG/DL (ref 37–246)
KAPPA LC SERPL-MCNC: 1.47 MG/DL (ref 0.33–1.94)
KAPPA LC/LAMBDA SER: 1.39 {RATIO} (ref 0.26–1.65)
LA 2 SCREEN W REFLEX-IMP: ABNORMAL
LAB TEST METHOD: NORMAL
LAMBDA LC SERPL-MCNC: 1.06 MG/DL (ref 0.57–2.63)
NORMALIZED SCT PPP-RTO: 0.96 RATIO
NUMBER OF CELLS COLLECTED: NORMAL PER TUBE
PATH REPORT.TOTAL CANCER: NORMAL
SIGNATURE COMMENT: NORMAL
SILICA CLOTTING TIME CONFIRMATION: 1.07 RATIO
SILICA CLOTTING TIME SCREEN: 1.03 RATIO
SPECIMEN VIABILITY: NORMAL

## 2024-07-06 LAB
ALBUMIN: 4 G/DL (ref 3.4–5)
ALPHA 1 GLOBULIN: 0.3 G/DL (ref 0.2–0.6)
ALPHA 2 GLOBULIN: 0.7 G/DL (ref 0.4–1.1)
BETA GLOBULIN: 0.7 G/DL (ref 0.5–1.2)
CMV IGM SERPL-ACNC: <8 AU/ML
COPPER SERPL-MCNC: 100.1 UG/DL (ref 70–140)
GAMMA GLOBULIN: 0.7 G/DL (ref 0.5–1.4)
PATH REVIEW-SERUM PROTEIN ELECTROPHORESIS: NORMAL
PROTEIN ELECTROPHORESIS COMMENT: NORMAL
ZINC SERPL-MCNC: 76.2 UG/DL (ref 60–120)

## 2024-07-08 LAB — ANA SER QL HEP2 SUBST: NEGATIVE

## 2024-07-09 ENCOUNTER — HOSPITAL ENCOUNTER (OUTPATIENT)
Dept: RADIOLOGY | Facility: CLINIC | Age: 69
Discharge: HOME | End: 2024-07-09
Payer: MEDICARE

## 2024-07-09 DIAGNOSIS — D69.6 THROMBOCYTOPENIA (CMS-HCC): ICD-10-CM

## 2024-07-09 DIAGNOSIS — D72.819 LEUKOPENIA, UNSPECIFIED TYPE: ICD-10-CM

## 2024-07-09 LAB
PLATELET ANTIBODY TARGET 1 IGG RESULT: NEGATIVE
PLATELET ANTIBODY TARGET 1 IGM RESULT: NEGATIVE
PLATELET ANTIBODY TARGET 2 IGG RESULT: NEGATIVE
PLATELET ANTIBODY TARGET 2 IGM RESULT: NEGATIVE
SCAN RESULT: NORMAL

## 2024-07-09 PROCEDURE — 76705 ECHO EXAM OF ABDOMEN: CPT

## 2024-07-09 PROCEDURE — 76705 ECHO EXAM OF ABDOMEN: CPT | Performed by: RADIOLOGY

## 2024-07-10 LAB
ELECTRONICALLY SIGNED BY: NORMAL
MYELOID NGS RESULTS: NORMAL

## 2024-07-31 ENCOUNTER — OFFICE VISIT (OUTPATIENT)
Dept: HEMATOLOGY/ONCOLOGY | Facility: CLINIC | Age: 69
End: 2024-07-31
Payer: MEDICARE

## 2024-07-31 VITALS
TEMPERATURE: 96.8 F | RESPIRATION RATE: 17 BRPM | HEART RATE: 78 BPM | WEIGHT: 282.19 LBS | BODY MASS INDEX: 41.18 KG/M2 | DIASTOLIC BLOOD PRESSURE: 85 MMHG | SYSTOLIC BLOOD PRESSURE: 130 MMHG | OXYGEN SATURATION: 94 %

## 2024-07-31 DIAGNOSIS — D69.6 THROMBOCYTOPENIA (CMS-HCC): Primary | ICD-10-CM

## 2024-07-31 DIAGNOSIS — D72.819 LEUKOPENIA, UNSPECIFIED TYPE: ICD-10-CM

## 2024-07-31 PROCEDURE — 1159F MED LIST DOCD IN RCRD: CPT | Performed by: PHYSICIAN ASSISTANT

## 2024-07-31 PROCEDURE — 99214 OFFICE O/P EST MOD 30 MIN: CPT | Performed by: PHYSICIAN ASSISTANT

## 2024-07-31 PROCEDURE — 1160F RVW MEDS BY RX/DR IN RCRD: CPT | Performed by: PHYSICIAN ASSISTANT

## 2024-07-31 PROCEDURE — 1126F AMNT PAIN NOTED NONE PRSNT: CPT | Performed by: PHYSICIAN ASSISTANT

## 2024-07-31 PROCEDURE — 1123F ACP DISCUSS/DSCN MKR DOCD: CPT | Performed by: PHYSICIAN ASSISTANT

## 2024-07-31 ASSESSMENT — PAIN SCALES - GENERAL: PAINLEVEL: 0-NO PAIN

## 2024-07-31 NOTE — PROGRESS NOTES
"Patient ID: Timbo Jean Baptiste \"Saul\" is a 68 y.o. male.  Referring Physician: No referring provider defined for this encounter.  Primary Care Provider: Isaiah Huntley MD  Visit Type: Follow Up    Location: [facility]  Diagnosis/Reason:   Thrombocytopenia (Likely Fatty Infitration of Liver vs ITP)  Leukopenia (B12 def vs Chronic Benign vs Fatty Infiltration of Liver)    Interval Hx:  7/31/24  Timbo Jean Baptiste is a 68 y.o. male following up today for thrombocytopenia (likely fatty infiltration of liver vs ITP) and leukopenia (likely B12 def vs chronic benign vs fatty infiltration)     NOTE:  7/3/24 - Patient has medical history significant for chronic stasis dermatitis, GERD.    Patient denies weight loss, abnormal bruising and bleeding, hematuria, blood in stool, dark/black stools, epistaxis, oral/gingival bleeding, lymphadenopathy, recurrent infections, recurrent fevers, night sweats, early satiety, abdominal pain, bone pain, chest pain, palpitations, SOB, TRINIDAD, fatigue, dizziness, lightheadedness, PICA.    Relevant Hx:  7/3/24 - Initial Visit  Timbo Jean Baptiste is a 68 y.o. male referred for consultation of thrombocytopenia & leukopenia.     Per review of records:  Platelets low dating back to Aug of 2019 ranging from .   Leukocytopenia in Feb 2022 and 2024 - 4.3 and 3.8   RBCs, hemoglobin and hematocrit and differentials WNL   Hep C negative   CMP WNL     Previous Hematological Background:  Hx of hematological disorders: No - Patient denies prior hematologic history Told by PCP in the past, about 4-5 years ago that his platelets were low. Monitored- never worked up.    Hx of blood transfusions: No - Patient denies prior blood transfusion  Hx of iron supplementation: No - Denies any previous supplementation  Hx of B12 supplementation: No - Denies any prior supplementation  Hx of folate supplementation: No - Denies any prior supplementation    Relevant medications:  Omeprazole PPI only   Currently using NSAID's " (Naproxen, Ibuprofen etc.): Rarely  Currently taking any supplements: Denies    Feels well. No concerns. Some black stools periodically - recently has colonoscopy. Epitaxis from left nares in the winter - takes about 5 mins to clot, has has it cauterized in the past. No other bleeding. Has right upper abd cramping pain, describes as MSK pain, happens a couple times a week, relief with rest. He has had lymphedema in her bilateral lower extremities for years, has gone to PT, uses pumps and compression socks.     Patient denies weight loss, abnormal bruising and bleeding, hematuria, blood in stool, oral/gingival bleeding, lymphadenopathy, recurrent infections, recurrent fevers, night sweats, early satiety, abdominal pain, bone pain, chest pain, palpitations, SOB, TRINIDAD, fatigue, dizziness, lightheadedness, PICA.    PMHx:  Active Ambulatory Problems     Diagnosis Date Noted    Chronic stasis dermatitis 02/26/2024    GERD (gastroesophageal reflux disease) 02/26/2024    Lymphedema of both lower extremities 02/26/2024    Thrombocytopenia (CMS-HCC) 02/26/2024    Body mass index (BMI) 40.0-44.9, adult (Multi) 02/26/2024    Hyperglycemia 04/29/2024    Hyperlipidemia 04/29/2024    Edema of lower extremity 04/29/2024    Muscle weakness 04/29/2024    Pain of lower extremity 04/29/2024    Sensation of chest tightness 04/29/2024    Sore throat 04/29/2024    Stiffness of ankle joint 04/29/2024    Wheezing 04/29/2024     Resolved Ambulatory Problems     Diagnosis Date Noted    Hereditary lymphedema 02/26/2024     Past Medical History:   Diagnosis Date    Cataract 09/2022    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   Father: prostate cancer     Social Hx:  Timbo Jean Baptiste \"Saul\"    reports that he quit smoking about 24 years ago. His smoking use included cigarettes. He started smoking about 49 years ago. He has a 26 pack-year smoking history. He has never used smokeless tobacco.  He  reports current alcohol use of about 4.0 standard drinks of alcohol per week.  He  reports no history of drug use.  Social History     Socioeconomic History    Marital status:    Tobacco Use    Smoking status: Former     Current packs/day: 0.00     Average packs/day: 1 pack/day for 26.0 years (26.0 ttl pk-yrs)     Types: Cigarettes     Start date: 1974     Quit date: 2000     Years since quittin.0    Smokeless tobacco: Never   Vaping Use    Vaping status: Never Used   Substance and Sexual Activity    Alcohol use: Yes     Alcohol/week: 4.0 standard drinks of alcohol     Types: 4 Cans of beer per week    Drug use: Never    Sexual activity: Not Currently     Partners: Female     Birth control/protection: Abstinence      Living Situation: with spouse   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    ROS:  10 point review of systems negative except as state in HPI.    Vitals & Statistics:  Objective   BSA: There is no height or weight on file to calculate BSA.  There were no vitals taken for this visit.    Physical Exam:  Physical Exam  Vitals reviewed.   Constitutional:       General: He is not in acute distress.     Appearance: He is obese. He is not toxic-appearing.   HENT:      Head: Normocephalic and atraumatic.      Nose: Nose normal.      Mouth/Throat:      Mouth: Mucous membranes are moist.      Pharynx: Oropharynx is clear.   Eyes:      Pupils: Pupils are equal, round, and reactive to light.   Cardiovascular:      Rate and Rhythm: Normal rate and regular rhythm.      Pulses: Normal pulses.      Heart sounds: Normal heart sounds. No murmur heard.  Pulmonary:      Effort: Pulmonary effort is normal. No respiratory distress.      Breath sounds: Normal breath sounds.   Abdominal:      General: Bowel sounds are normal. There is no distension.      Palpations: Abdomen is soft. There is no mass.      Tenderness: There is no abdominal tenderness. There is no guarding.   Musculoskeletal:         General: Normal range of motion.      Cervical back: Normal range of motion and neck supple.      Right lower leg: Edema present.      Left lower leg: Edema present.   Lymphadenopathy:      Head:      Right side of head: No submental, submandibular, tonsillar, preauricular or posterior auricular adenopathy.      Left side of head: No submental, submandibular, tonsillar, preauricular or posterior auricular adenopathy.      Cervical: No cervical adenopathy.      Right cervical: No superficial or posterior cervical adenopathy.     Left cervical: No superficial or posterior cervical adenopathy.      Upper Body:      Right upper body: No supraclavicular or axillary adenopathy.      Left upper body: No supraclavicular or axillary adenopathy.   Skin:     General: Skin is  "warm and dry.      Capillary Refill: Capillary refill takes less than 2 seconds.   Neurological:      General: No focal deficit present.      Mental Status: He is alert and oriented to person, place, and time.   Psychiatric:         Mood and Affect: Mood normal.         Behavior: Behavior normal.         Thought Content: Thought content normal.         Judgment: Judgment normal.     Results:  Lab Results   Component Value Date    WBC 3.4 (L) 07/03/2024    NEUTROABS 2.14 07/03/2024    IGABSOL 0.01 07/03/2024    LYMPHSABS 0.79 (L) 07/03/2024    MONOSABS 0.38 07/03/2024    EOSABS 0.08 07/03/2024    BASOSABS 0.02 07/03/2024    RBC 4.30 (L) 07/03/2024    MCV 94 07/03/2024    MCHC 34.8 07/03/2024    HGB 14.1 07/03/2024    HCT 40.5 (L) 07/03/2024    PLT 89 (L) 07/03/2024     No results found for: \"RETICCTPCT\"   Lab Results   Component Value Date    CREATININE 1.15 07/03/2024    BUN 20 07/03/2024    EGFR 69 07/03/2024     07/03/2024    K 4.0 07/03/2024     07/03/2024    CO2 29 07/03/2024      Lab Results   Component Value Date    ALT 18 07/03/2024    AST 17 07/03/2024    ALKPHOS 61 07/03/2024    BILITOT 1.1 07/03/2024      Lab Results   Component Value Date    TSH 3.51 08/02/2019     Lab Results   Component Value Date    TSH 3.51 08/02/2019     No results found for: \"IRON\", \"TIBC\", \"FERRITIN\"   Lab Results   Component Value Date    KTJYVHLI93 235 07/03/2024      Lab Results   Component Value Date    FOLATE 16.6 07/03/2024     Lab Results   Component Value Date    OLGA Negative 07/03/2024    RF <10 07/03/2024    SEDRATE 5 07/03/2024      Lab Results   Component Value Date    CRP 0.80 07/03/2024      No results found for: \"KAMI\"  Lab Results   Component Value Date     07/03/2024     Lab Results   Component Value Date    HAPTOGLOBIN 140 07/03/2024     Lab Results   Component Value Date    SPEP Normal. 07/03/2024     Lab Results   Component Value Date     07/03/2024    IGM 48 07/03/2024    IGA 67 (L) " 2024     Lab Results   Component Value Date    HEPBCIGM Nonreactive 2024    HEPBSAG Nonreactive 2024    HEPCAB Nonreactive 2024     Lab Results   Component Value Date    HIV1X2 Nonreactive 2024       Assessment:  Timbo Jean Baptiste is a 68 y.o. male referred for consultation of thrombocytopenia & leukopenia.     He is asymptomatic w/ unremarkable physical exam    I reviewed patient's chart including but not limited to labs, imaging, surgical/procedure notes, pathology, hospital notes, doctor's notes.    Relevant historical labs/imagin24  HCV: Negative    7/3/24 results:  Leukopenia at 3.4 - Lymphopenia at 0.79  NC/NC erythrocytopenia w/ RBC count at 4.30 - Hct low, Hb WNL - RDW WNL  Thrombocytopenia at 89K  Myeloid malignancy panel: Negative  Flow cytometry: Negative  Platelet antibodies: All negative  Renal: BUN & Creatinine (1.15) WNL - GFR low at 69 - Indicative of CKD as GFR low at 64 24  B12: Low-normal at 235  OLGA: Negative  APS ab's: Negative  DRVVT/SCT: Negative    24  Abdominal U/S: Liver with fatty infiltration    Plan:  Discussed seeking medical attention if became sick d/t low wbc counts. To call us sooner for appt if starts getting B symptoms, recurrent infections or lumps.  Discussed possible etiologies of leukopenia including: vitamin deficiency, autoimmune disease, infection, inflammatory disorder, allergies, chronic benign leukopenia, malignancy, etc.   Discussed possible etiologies of thrombocytopenia including: vitamin deficiency, enlarged spleen/liver, autoimmune disease, infection, inflammatory disorder, allergies, collection tube clumping, etc. Will start hematological workup today.    1) Leukopenia - Most likely 2/2 Hepatic Steatosis  2) Thrombocytopenia - Most likely 2/2 Hepatic Steatosis & EtOH vs ITP  Start vitamin B12 500mcg PO QD x 4 months  Discussed need for referral to hepatology 2/2 fatty infiltration of liver. Also discussed with patient  need to stop/decrease drinking and that recommended max dose of EtOH for adult males is 2 drinks per day. Patient verbalized udnerstanding.  RTC in 4 months via in-person visit - F/U sooner if needed/urgent  CBC-D, CMP, B12, Folate, Iron studies up to 1 week prior  If patient & counts are stable at next visit, will defer continued monitoring to patient's PCP    I had an extensive discussion with the patient regarding the diagnosis and discussed the plan of therapy, including general considerations regarding side effects and outcomes. Pt understood and gave appropriate teach back about the plan of care. All questions were answered to the patient's satisfaction. The patient is instructed to contact us at any time if questions or problems arise. Thank you for the opportunity to participate in the care of this very pleasant patient.    Total time = 30 minutes. 50% or more of this time was spent in counseling and/or coordination of care including reviewing medical history/radiology/labs, examining patient, formulating outlined plan with team, and discussing plan with patient/family.    Shankar De Leon PA-C

## 2024-12-05 ENCOUNTER — LAB (OUTPATIENT)
Dept: LAB | Facility: LAB | Age: 69
End: 2024-12-05
Payer: MEDICARE

## 2024-12-05 DIAGNOSIS — D69.6 THROMBOCYTOPENIA (CMS-HCC): ICD-10-CM

## 2024-12-05 DIAGNOSIS — D72.819 LEUKOPENIA, UNSPECIFIED TYPE: ICD-10-CM

## 2024-12-05 LAB
ALBUMIN SERPL BCP-MCNC: 4.1 G/DL (ref 3.4–5)
ALP SERPL-CCNC: 64 U/L (ref 33–136)
ALT SERPL W P-5'-P-CCNC: 14 U/L (ref 10–52)
ANION GAP SERPL CALC-SCNC: 10 MMOL/L (ref 10–20)
AST SERPL W P-5'-P-CCNC: 12 U/L (ref 9–39)
BASOPHILS # BLD AUTO: 0.03 X10*3/UL (ref 0–0.1)
BASOPHILS NFR BLD AUTO: 0.7 %
BILIRUB SERPL-MCNC: 1.1 MG/DL (ref 0–1.2)
BUN SERPL-MCNC: 16 MG/DL (ref 6–23)
CALCIUM SERPL-MCNC: 8.5 MG/DL (ref 8.6–10.3)
CHLORIDE SERPL-SCNC: 101 MMOL/L (ref 98–107)
CO2 SERPL-SCNC: 30 MMOL/L (ref 21–32)
CREAT SERPL-MCNC: 1.24 MG/DL (ref 0.5–1.3)
EGFRCR SERPLBLD CKD-EPI 2021: 63 ML/MIN/1.73M*2
EOSINOPHIL # BLD AUTO: 0.12 X10*3/UL (ref 0–0.7)
EOSINOPHIL NFR BLD AUTO: 2.7 %
ERYTHROCYTE [DISTWIDTH] IN BLOOD BY AUTOMATED COUNT: 12.2 % (ref 11.5–14.5)
FERRITIN SERPL-MCNC: 310 NG/ML (ref 20–300)
FOLATE SERPL-MCNC: 12.4 NG/ML
GLUCOSE SERPL-MCNC: 130 MG/DL (ref 74–99)
HCT VFR BLD AUTO: 41.6 % (ref 41–52)
HGB BLD-MCNC: 14.5 G/DL (ref 13.5–17.5)
IMM GRANULOCYTES # BLD AUTO: 0.02 X10*3/UL (ref 0–0.7)
IMM GRANULOCYTES NFR BLD AUTO: 0.4 % (ref 0–0.9)
IRON SATN MFR SERPL: 32 % (ref 25–45)
IRON SERPL-MCNC: 120 UG/DL (ref 35–150)
LYMPHOCYTES # BLD AUTO: 1.16 X10*3/UL (ref 1.2–4.8)
LYMPHOCYTES NFR BLD AUTO: 26 %
MCH RBC QN AUTO: 32.4 PG (ref 26–34)
MCHC RBC AUTO-ENTMCNC: 34.9 G/DL (ref 32–36)
MCV RBC AUTO: 93 FL (ref 80–100)
MONOCYTES # BLD AUTO: 0.58 X10*3/UL (ref 0.1–1)
MONOCYTES NFR BLD AUTO: 13 %
NEUTROPHILS # BLD AUTO: 2.56 X10*3/UL (ref 1.2–7.7)
NEUTROPHILS NFR BLD AUTO: 57.2 %
NRBC BLD-RTO: 0 /100 WBCS (ref 0–0)
PLATELET # BLD AUTO: 106 X10*3/UL (ref 150–450)
POTASSIUM SERPL-SCNC: 4.3 MMOL/L (ref 3.5–5.3)
PROT SERPL-MCNC: 6.3 G/DL (ref 6.4–8.2)
RBC # BLD AUTO: 4.48 X10*6/UL (ref 4.5–5.9)
SODIUM SERPL-SCNC: 137 MMOL/L (ref 136–145)
TIBC SERPL-MCNC: 374 UG/DL (ref 240–445)
UIBC SERPL-MCNC: 254 UG/DL (ref 110–370)
VIT B12 SERPL-MCNC: 439 PG/ML (ref 211–911)
WBC # BLD AUTO: 4.5 X10*3/UL (ref 4.4–11.3)

## 2024-12-05 PROCEDURE — 83540 ASSAY OF IRON: CPT

## 2024-12-05 PROCEDURE — 82728 ASSAY OF FERRITIN: CPT

## 2024-12-05 PROCEDURE — 85025 COMPLETE CBC W/AUTO DIFF WBC: CPT

## 2024-12-05 PROCEDURE — 82607 VITAMIN B-12: CPT

## 2024-12-05 PROCEDURE — 83550 IRON BINDING TEST: CPT

## 2024-12-05 PROCEDURE — 36415 COLL VENOUS BLD VENIPUNCTURE: CPT

## 2024-12-05 PROCEDURE — 82746 ASSAY OF FOLIC ACID SERUM: CPT

## 2024-12-05 PROCEDURE — 80053 COMPREHEN METABOLIC PANEL: CPT

## 2024-12-10 NOTE — RESULT ENCOUNTER NOTE
"Please call Timbo \"Saul\"  regarding lab results and tell him  : the glucose or sugar level is mildly high at 117 . Please schedule an appointment to have this rechecked in the office .  The rest of the labs are normal or stable but we will go over this in detail at that visit as well" Called the patient to go over her options regarding imaging for further investigation of her chronically elevated alk phos levels.  She does have a history of having part of her pancreas surgically removed.  It is possible that could be attributing to her elevated Alk Phos levels.     Shared decision-making was had, at this time the patient would rather get a CT abdomen and pelvis done first rather than an MRI as that would be simpler/easier for her.  She is aware that if an MRI is warranted I can place the order for her to have general anesthesia.  At this time we will just await the results of the CT scan before investigating further with additional imaging.    Chyna Dawson MD

## 2024-12-11 ENCOUNTER — OFFICE VISIT (OUTPATIENT)
Dept: HEMATOLOGY/ONCOLOGY | Facility: CLINIC | Age: 69
End: 2024-12-11
Payer: MEDICARE

## 2024-12-11 VITALS
HEART RATE: 79 BPM | BODY MASS INDEX: 41.97 KG/M2 | SYSTOLIC BLOOD PRESSURE: 142 MMHG | DIASTOLIC BLOOD PRESSURE: 85 MMHG | OXYGEN SATURATION: 95 % | TEMPERATURE: 97.5 F | RESPIRATION RATE: 16 BRPM | WEIGHT: 287.59 LBS

## 2024-12-11 DIAGNOSIS — D72.819 LEUKOPENIA, UNSPECIFIED TYPE: ICD-10-CM

## 2024-12-11 DIAGNOSIS — D69.6 THROMBOCYTOPENIA (CMS-HCC): Primary | ICD-10-CM

## 2024-12-11 PROCEDURE — 1160F RVW MEDS BY RX/DR IN RCRD: CPT | Performed by: PHYSICIAN ASSISTANT

## 2024-12-11 PROCEDURE — 99214 OFFICE O/P EST MOD 30 MIN: CPT | Performed by: PHYSICIAN ASSISTANT

## 2024-12-11 PROCEDURE — 1123F ACP DISCUSS/DSCN MKR DOCD: CPT | Performed by: PHYSICIAN ASSISTANT

## 2024-12-11 PROCEDURE — 1126F AMNT PAIN NOTED NONE PRSNT: CPT | Performed by: PHYSICIAN ASSISTANT

## 2024-12-11 PROCEDURE — 1159F MED LIST DOCD IN RCRD: CPT | Performed by: PHYSICIAN ASSISTANT

## 2024-12-11 RX ORDER — LANOLIN ALCOHOL/MO/W.PET/CERES
1000 CREAM (GRAM) TOPICAL DAILY
COMMUNITY

## 2024-12-11 ASSESSMENT — PAIN SCALES - GENERAL: PAINLEVEL_OUTOF10: 0-NO PAIN

## 2024-12-11 NOTE — PROGRESS NOTES
"Patient ID: Timbo Jean Baptiste \"Saul\" is a 69 y.o. male.  Referring Physician: Shankar De Leon PA-C  [id number] [place] [address]  Primary Care Provider: Isaiah Huntley MD  Visit Type: Follow Up    Location: [facility]  Diagnosis/Reason:   Thrombocytopenia (Likely Fatty Infitration of Liver vs ITP)  Leukopenia (B12 def vs Chronic Benign vs Fatty Infiltration of Liver)    Interval Hx:  12/11/24  Timbo Jean Baptiste is a 69 y.o. male following up today for thrombocytopenia (likely fatty infiltration of liver vs ITP) and leukopenia (likely B12 def vs chronic benign vs fatty infiltration)     NOTE:  7/3/24 - Patient has medical history significant for chronic stasis dermatitis, GERD.    Patient denies weight loss, abnormal bruising and bleeding, hematuria, blood in stool, dark/black stools, epistaxis, oral/gingival bleeding, lymphadenopathy, recurrent infections, recurrent fevers, night sweats, early satiety, abdominal pain, bone pain, chest pain, palpitations, SOB, TRINIDAD, fatigue, dizziness, lightheadedness, PICA.    Relevant Hx:  7/3/24 - Initial Visit  Timbo Jean Baptiste is a 69 y.o. male referred for consultation of thrombocytopenia & leukopenia.     Per review of records:  Platelets low dating back to Aug of 2019 ranging from .   Leukocytopenia in Feb 2022 and 2024 - 4.3 and 3.8   RBCs, hemoglobin and hematocrit and differentials WNL   Hep C negative   CMP WNL     Previous Hematological Background:  Hx of hematological disorders: No - Patient denies prior hematologic history Told by PCP in the past, about 4-5 years ago that his platelets were low. Monitored- never worked up.    Hx of blood transfusions: No - Patient denies prior blood transfusion  Hx of iron supplementation: No - Denies any previous supplementation  Hx of B12 supplementation: No - Denies any prior supplementation  Hx of folate supplementation: No - Denies any prior supplementation    Relevant medications:  Omeprazole PPI only   Currently " using NSAID's (Naproxen, Ibuprofen etc.): Rarely  Currently taking any supplements: Denies    Feels well. No concerns. Some black stools periodically - recently has colonoscopy. Epitaxis from left nares in the winter - takes about 5 mins to clot, has has it cauterized in the past. No other bleeding. Has right upper abd cramping pain, describes as MSK pain, happens a couple times a week, relief with rest. He has had lymphedema in her bilateral lower extremities for years, has gone to PT, uses pumps and compression socks.     Patient denies weight loss, abnormal bruising and bleeding, hematuria, blood in stool, oral/gingival bleeding, lymphadenopathy, recurrent infections, recurrent fevers, night sweats, early satiety, abdominal pain, bone pain, chest pain, palpitations, SOB, TRINIDAD, fatigue, dizziness, lightheadedness, PICA.    PMHx:  Active Ambulatory Problems     Diagnosis Date Noted    Chronic stasis dermatitis 02/26/2024    GERD (gastroesophageal reflux disease) 02/26/2024    Lymphedema of both lower extremities 02/26/2024    Thrombocytopenia (CMS-HCC) 02/26/2024    Body mass index (BMI) 40.0-44.9, adult (Multi) 02/26/2024    Hyperglycemia 04/29/2024    Hyperlipidemia 04/29/2024    Edema of lower extremity 04/29/2024    Muscle weakness 04/29/2024    Pain of lower extremity 04/29/2024    Sensation of chest tightness 04/29/2024    Sore throat 04/29/2024    Stiffness of ankle joint 04/29/2024    Wheezing 04/29/2024     Resolved Ambulatory Problems     Diagnosis Date Noted    Hereditary lymphedema 02/26/2024     Past Medical History:   Diagnosis Date    Cataract 09/2022    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   Father: prostate cancer     Social Hx:  Timbo Jean Baptiste \"Saul\"    reports that he quit smoking about 24 years ago. His smoking use included cigarettes. He started smoking about 50 years ago. He has a 26 pack-year smoking history. He has never used smokeless tobacco.  He  reports current alcohol use of about 4.0 standard drinks of alcohol per week.  He  reports no history of drug use.  Social History     Socioeconomic History    Marital status:    Tobacco Use    Smoking status: Former     Current packs/day: 0.00     Average packs/day: 1 pack/day for 26.0 years (26.0 ttl pk-yrs)     Types: Cigarettes     Start date: 1974     Quit date: 2000     Years since quittin.3    Smokeless tobacco: Never   Vaping Use    Vaping status: Never Used   Substance and Sexual Activity    Alcohol use: Yes     Alcohol/week: 4.0 standard drinks of alcohol     Types: 4 Cans of beer per week    Drug use: Never    Sexual activity: Not Currently     Partners: Female     Birth control/protection: Abstinence      Living Situation: with spouse   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    ROS:  10 point review of systems negative except as state in HPI.    Vitals & Statistics:  Objective   BSA: 2.52 meters squared  /85 (BP Location: Left arm, Patient Position: Sitting, BP Cuff Size: Large adult)   Pulse 79   Temp 36.4 °C (97.5 °F) (Temporal)   Resp 16   Wt 130 kg (287 lb 9.4 oz)   SpO2 95%   BMI 41.97 kg/m²     Physical Exam:  Physical Exam  Vitals reviewed.   Constitutional:       General: He is not in acute distress.     Appearance: He is obese. He is not toxic-appearing.   HENT:      Head: Normocephalic and atraumatic.      Nose: Nose normal.      Mouth/Throat:      Mouth: Mucous membranes are moist.      Pharynx: Oropharynx is clear.   Eyes:      Pupils: Pupils are equal, round, and reactive to light.   Cardiovascular:      Rate and Rhythm: Normal rate and regular rhythm.      Pulses: Normal pulses.      Heart sounds: Normal heart sounds. No murmur heard.  Pulmonary:      Effort: Pulmonary effort is normal. No respiratory distress.      Breath sounds: Normal breath sounds.   Abdominal:      General: Bowel sounds are normal. There is no distension.      Palpations: Abdomen is soft. There is no mass.      Tenderness: There is no abdominal tenderness. There is no guarding.   Musculoskeletal:         General: Normal range of motion.      Cervical back: Normal range of motion and neck supple.      Right lower leg: Edema present.      Left lower leg: Edema present.   Lymphadenopathy:      Head:      Right side of head: No submental, submandibular, tonsillar, preauricular or posterior auricular adenopathy.      Left side of head: No submental, submandibular, tonsillar, preauricular or posterior auricular adenopathy.      Cervical: No cervical adenopathy.      Right cervical: No superficial or posterior cervical adenopathy.     Left cervical: No superficial or posterior cervical adenopathy.      Upper Body:      Right upper body: No  "supraclavicular or axillary adenopathy.      Left upper body: No supraclavicular or axillary adenopathy.   Skin:     General: Skin is warm and dry.      Capillary Refill: Capillary refill takes less than 2 seconds.   Neurological:      General: No focal deficit present.      Mental Status: He is alert and oriented to person, place, and time.   Psychiatric:         Mood and Affect: Mood normal.         Behavior: Behavior normal.         Thought Content: Thought content normal.         Judgment: Judgment normal.     Results:  Lab Results   Component Value Date    WBC 4.5 12/05/2024    NEUTROABS 2.56 12/05/2024    IGABSOL 0.02 12/05/2024    LYMPHSABS 1.16 (L) 12/05/2024    MONOSABS 0.58 12/05/2024    EOSABS 0.12 12/05/2024    BASOSABS 0.03 12/05/2024    RBC 4.48 (L) 12/05/2024    MCV 93 12/05/2024    MCHC 34.9 12/05/2024    HGB 14.5 12/05/2024    HCT 41.6 12/05/2024     (L) 12/05/2024     No results found for: \"RETICCTPCT\"   Lab Results   Component Value Date    CREATININE 1.24 12/05/2024    BUN 16 12/05/2024    EGFR 63 12/05/2024     12/05/2024    K 4.3 12/05/2024     12/05/2024    CO2 30 12/05/2024      Lab Results   Component Value Date    ALT 14 12/05/2024    AST 12 12/05/2024    ALKPHOS 64 12/05/2024    BILITOT 1.1 12/05/2024      Lab Results   Component Value Date    TSH 3.51 08/02/2019     Lab Results   Component Value Date    TSH 3.51 08/02/2019     Lab Results   Component Value Date    IRON 120 12/05/2024    TIBC 374 12/05/2024    FERRITIN 310 (H) 12/05/2024      Lab Results   Component Value Date    CVPGQYTX13 439 12/05/2024      Lab Results   Component Value Date    FOLATE 12.4 12/05/2024     Lab Results   Component Value Date    OLGA Negative 07/03/2024    RF <10 07/03/2024    SEDRATE 5 07/03/2024      Lab Results   Component Value Date    CRP 0.80 07/03/2024      No results found for: \"KAMI\"  Lab Results   Component Value Date     07/03/2024     Lab Results   Component Value Date    " HAPTOGLOBIN 140 2024     Lab Results   Component Value Date    SPEP Normal. 2024     Lab Results   Component Value Date     2024    IGM 48 2024    IGA 67 (L) 2024     Lab Results   Component Value Date    HEPBCIGM Nonreactive 2024    HEPBSAG Nonreactive 2024    HEPCAB Nonreactive 2024     Lab Results   Component Value Date    HIV1X2 Nonreactive 2024       Assessment:  Timbo Jean Baptiste is a 69 y.o. male following up today for hrombocytopenia (likely fatty infiltration of liver vs ITP) and leukopenia (likely B12 def vs chronic benign vs fatty infiltration)      He is asymptomatic w/ unremarkable physical exam    I reviewed patient's chart including but not limited to labs, imaging, surgical/procedure notes, pathology, hospital notes, doctor's notes.    Relevant historical labs/imagin24  HCV: Negative    24 results:  WBC count WNL - Isolated lymphopenia at 1.16  NC/NC erythrocytopenia at 4.48 - H&H WNL, RDW WNL  Improved thrombocytopenia at 106K  Iron studies: Ferritin elevated at 310 - Iron, TIBC, %Sat (32%) all WNL    Plan:  Discussed seeking medical attention if became sick d/t low wbc counts. To call us sooner for appt if starts getting B symptoms, recurrent infections or lumps.  Discussed possible etiologies of leukopenia including: vitamin deficiency, autoimmune disease, infection, inflammatory disorder, allergies, chronic benign leukopenia, malignancy, etc.   Discussed possible etiologies of thrombocytopenia including: vitamin deficiency, enlarged spleen/liver, autoimmune disease, infection, inflammatory disorder, allergies, collection tube clumping, etc. Will start hematological workup today.    1) Leukopenia - Most likely 2/2 Hepatic Steatosis  2) Thrombocytopenia - Most likely 2/2 Hepatic Steatosis & EtOH vs ITP  Stop Vitamin B12  No need for hematologic intervention at this time - Platelets > 30-50K, Wbc count & lymphocyte count  improved w/ no new infections  RTC in 6 months via in-person visit - F/U sooner if needed/urgent  CBC-D, CMP, B12, Folate, Iron studies up to 1 week prior  If patient & counts are stable at next visit, will defer continued monitoring to patient's PCP    I had an extensive discussion with the patient regarding the diagnosis and discussed the plan of therapy, including general considerations regarding side effects and outcomes. Pt understood and gave appropriate teach back about the plan of care. All questions were answered to the patient's satisfaction. The patient is instructed to contact us at any time if questions or problems arise. Thank you for the opportunity to participate in the care of this very pleasant patient.    Total time = 30 minutes. 50% or more of this time was spent in counseling and/or coordination of care including reviewing medical history/radiology/labs, examining patient, formulating outlined plan with team, and discussing plan with patient/family.    Shankar De Leon PA-C

## 2025-02-24 ENCOUNTER — APPOINTMENT (OUTPATIENT)
Dept: PRIMARY CARE | Facility: CLINIC | Age: 70
End: 2025-02-24
Payer: MEDICARE

## 2025-03-02 ASSESSMENT — PROMIS GLOBAL HEALTH SCALE
CARRYOUT_PHYSICAL_ACTIVITIES: COMPLETELY
CARRYOUT_SOCIAL_ACTIVITIES: GOOD
RATE_AVERAGE_FATIGUE: MILD
RATE_MENTAL_HEALTH: GOOD
RATE_GENERAL_HEALTH: GOOD
EMOTIONAL_PROBLEMS: RARELY
RATE_QUALITY_OF_LIFE: GOOD
RATE_SOCIAL_SATISFACTION: GOOD
RATE_PHYSICAL_HEALTH: GOOD
RATE_AVERAGE_PAIN: 0

## 2025-03-03 ENCOUNTER — APPOINTMENT (OUTPATIENT)
Dept: PRIMARY CARE | Facility: CLINIC | Age: 70
End: 2025-03-03
Payer: MEDICARE

## 2025-03-03 VITALS
TEMPERATURE: 97.6 F | WEIGHT: 290 LBS | BODY MASS INDEX: 42.32 KG/M2 | HEART RATE: 70 BPM | DIASTOLIC BLOOD PRESSURE: 82 MMHG | RESPIRATION RATE: 16 BRPM | SYSTOLIC BLOOD PRESSURE: 118 MMHG

## 2025-03-03 DIAGNOSIS — Z00.00 ROUTINE GENERAL MEDICAL EXAMINATION AT HEALTH CARE FACILITY: Primary | ICD-10-CM

## 2025-03-03 DIAGNOSIS — I89.0 LYMPHEDEMA OF BOTH LOWER EXTREMITIES: ICD-10-CM

## 2025-03-03 DIAGNOSIS — L91.8 SKIN TAG: ICD-10-CM

## 2025-03-03 DIAGNOSIS — I87.2 CHRONIC STASIS DERMATITIS: ICD-10-CM

## 2025-03-03 DIAGNOSIS — D69.6 THROMBOCYTOPENIA (CMS-HCC): ICD-10-CM

## 2025-03-03 DIAGNOSIS — E66.01 OBESITY, MORBID (MULTI): ICD-10-CM

## 2025-03-03 PROBLEM — M62.81 MUSCLE WEAKNESS: Status: RESOLVED | Noted: 2024-04-29 | Resolved: 2025-03-03

## 2025-03-03 PROBLEM — M79.606 PAIN OF LOWER EXTREMITY: Status: RESOLVED | Noted: 2024-04-29 | Resolved: 2025-03-03

## 2025-03-03 PROBLEM — R07.89 SENSATION OF CHEST TIGHTNESS: Status: RESOLVED | Noted: 2024-04-29 | Resolved: 2025-03-03

## 2025-03-03 PROBLEM — R06.2 WHEEZING: Status: RESOLVED | Noted: 2024-04-29 | Resolved: 2025-03-03

## 2025-03-03 PROBLEM — R60.0 EDEMA OF LOWER EXTREMITY: Status: RESOLVED | Noted: 2024-04-29 | Resolved: 2025-03-03

## 2025-03-03 PROBLEM — M25.673 STIFFNESS OF ANKLE JOINT: Status: RESOLVED | Noted: 2024-04-29 | Resolved: 2025-03-03

## 2025-03-03 PROBLEM — R73.9 HYPERGLYCEMIA: Status: RESOLVED | Noted: 2024-04-29 | Resolved: 2025-03-03

## 2025-03-03 PROBLEM — J02.9 SORE THROAT: Status: RESOLVED | Noted: 2024-04-29 | Resolved: 2025-03-03

## 2025-03-03 PROCEDURE — 1036F TOBACCO NON-USER: CPT | Performed by: FAMILY MEDICINE

## 2025-03-03 PROCEDURE — 1170F FXNL STATUS ASSESSED: CPT | Performed by: FAMILY MEDICINE

## 2025-03-03 PROCEDURE — G0439 PPPS, SUBSEQ VISIT: HCPCS | Performed by: FAMILY MEDICINE

## 2025-03-03 PROCEDURE — G0444 DEPRESSION SCREEN ANNUAL: HCPCS | Performed by: FAMILY MEDICINE

## 2025-03-03 PROCEDURE — 1160F RVW MEDS BY RX/DR IN RCRD: CPT | Performed by: FAMILY MEDICINE

## 2025-03-03 PROCEDURE — 1159F MED LIST DOCD IN RCRD: CPT | Performed by: FAMILY MEDICINE

## 2025-03-03 PROCEDURE — 1158F ADVNC CARE PLAN TLK DOCD: CPT | Performed by: FAMILY MEDICINE

## 2025-03-03 PROCEDURE — 1123F ACP DISCUSS/DSCN MKR DOCD: CPT | Performed by: FAMILY MEDICINE

## 2025-03-03 ASSESSMENT — ACTIVITIES OF DAILY LIVING (ADL)
TAKING_MEDICATION: INDEPENDENT
BATHING: INDEPENDENT
MANAGING_FINANCES: INDEPENDENT
DRESSING: INDEPENDENT
DOING_HOUSEWORK: INDEPENDENT
GROCERY_SHOPPING: INDEPENDENT

## 2025-03-03 ASSESSMENT — PATIENT HEALTH QUESTIONNAIRE - PHQ9
SUM OF ALL RESPONSES TO PHQ9 QUESTIONS 1 AND 2: 0
2. FEELING DOWN, DEPRESSED OR HOPELESS: NOT AT ALL
1. LITTLE INTEREST OR PLEASURE IN DOING THINGS: NOT AT ALL

## 2025-03-03 NOTE — PROGRESS NOTES
Subjective   Reason for Visit: Timbo Jean Baptiste is an 69 y.o. male here for a Medicare Wellness visit.               HPI    Patient Care Team:  Isaiah Huntley MD as PCP - General     Review of Systems    Objective   Vitals:  There were no vitals taken for this visit.      Physical Exam  Constitutional:       Appearance: Normal appearance.   HENT:      Head: Normocephalic.   Eyes:      Conjunctiva/sclera: Conjunctivae normal.   Cardiovascular:      Rate and Rhythm: Normal rate and regular rhythm.      Heart sounds: Normal heart sounds.   Pulmonary:      Effort: Pulmonary effort is normal.      Breath sounds: Normal breath sounds.   Musculoskeletal:      Cervical back: Neck supple.      Right lower leg: Edema present.      Left lower leg: Edema present.   Skin:     General: Skin is warm and dry.      Comments: Skin tag on the left upper eyelid    Bronze purple chronic discoloration of the shins symmetric in the stocking pattern   Neurological:      Mental Status: He is alert.         Assessment & Plan  Routine general medical examination at health care facility  He is up-to-date on blood work and vaccinations.  Orders:    1 Year Follow Up In [facility] - PCP - Wellness Exam; Future    Thrombocytopenia (CMS-HCC)  He has been thoroughly evaluated by hematology and no underlying pathology was identified.  This is felt to be ideal pathic thrombocytopenia or possibly alcohol related.  At this point we will continue to monitor his platelets every 6 months and as long as they stay above 100 no further intervention is necessary.       Chronic stasis dermatitis  This is stable and unchanged.  There is only 1+ pitting edema today.  He will continue to elevate the legs is much as possible       Lymphedema of both lower extremities         Obesity, morbid (Multi)  I continue to advise regular exercise and healthy diet       Skin tag    Orders:    Referral to Dermatology           Depression Screening  5 - 10 minutes  were spent screening for depression.

## 2025-03-03 NOTE — ASSESSMENT & PLAN NOTE
This is stable and unchanged.  There is only 1+ pitting edema today.  He will continue to elevate the legs is much as possible

## 2025-03-03 NOTE — ASSESSMENT & PLAN NOTE
He has been thoroughly evaluated by hematology and no underlying pathology was identified.  This is felt to be ideal pathic thrombocytopenia or possibly alcohol related.  At this point we will continue to monitor his platelets every 6 months and as long as they stay above 100 no further intervention is necessary.

## 2025-06-03 ENCOUNTER — LAB (OUTPATIENT)
Dept: LAB | Facility: HOSPITAL | Age: 70
End: 2025-06-03
Payer: MEDICARE

## 2025-06-03 DIAGNOSIS — D72.819 LEUKOPENIA, UNSPECIFIED TYPE: ICD-10-CM

## 2025-06-03 DIAGNOSIS — D69.6 THROMBOCYTOPENIA: ICD-10-CM

## 2025-06-03 LAB
BASOPHILS # BLD AUTO: 0.03 X10*3/UL (ref 0–0.1)
BASOPHILS NFR BLD AUTO: 0.8 %
EOSINOPHIL # BLD AUTO: 0.09 X10*3/UL (ref 0–0.7)
EOSINOPHIL NFR BLD AUTO: 2.3 %
ERYTHROCYTE [DISTWIDTH] IN BLOOD BY AUTOMATED COUNT: 12.4 % (ref 11.5–14.5)
HCT VFR BLD AUTO: 41.2 % (ref 41–52)
HGB BLD-MCNC: 14.4 G/DL (ref 13.5–17.5)
IMM GRANULOCYTES # BLD AUTO: 0.02 X10*3/UL (ref 0–0.7)
IMM GRANULOCYTES NFR BLD AUTO: 0.5 % (ref 0–0.9)
LYMPHOCYTES # BLD AUTO: 0.94 X10*3/UL (ref 1.2–4.8)
LYMPHOCYTES NFR BLD AUTO: 24.2 %
MCH RBC QN AUTO: 32.4 PG (ref 26–34)
MCHC RBC AUTO-ENTMCNC: 35 G/DL (ref 32–36)
MCV RBC AUTO: 93 FL (ref 80–100)
MONOCYTES # BLD AUTO: 0.46 X10*3/UL (ref 0.1–1)
MONOCYTES NFR BLD AUTO: 11.9 %
NEUTROPHILS # BLD AUTO: 2.34 X10*3/UL (ref 1.2–7.7)
NEUTROPHILS NFR BLD AUTO: 60.3 %
NRBC BLD-RTO: 0 /100 WBCS (ref 0–0)
PLATELET # BLD AUTO: 83 X10*3/UL (ref 150–450)
RBC # BLD AUTO: 4.45 X10*6/UL (ref 4.5–5.9)
WBC # BLD AUTO: 3.9 X10*3/UL (ref 4.4–11.3)

## 2025-06-03 PROCEDURE — 85025 COMPLETE CBC W/AUTO DIFF WBC: CPT

## 2025-06-11 ENCOUNTER — APPOINTMENT (OUTPATIENT)
Dept: HEMATOLOGY/ONCOLOGY | Facility: CLINIC | Age: 70
End: 2025-06-11
Payer: MEDICARE

## 2025-06-12 ENCOUNTER — OFFICE VISIT (OUTPATIENT)
Dept: HEMATOLOGY/ONCOLOGY | Facility: CLINIC | Age: 70
End: 2025-06-12
Payer: MEDICARE

## 2025-06-12 VITALS
DIASTOLIC BLOOD PRESSURE: 74 MMHG | WEIGHT: 289.68 LBS | OXYGEN SATURATION: 94 % | HEART RATE: 68 BPM | TEMPERATURE: 97.9 F | BODY MASS INDEX: 42.28 KG/M2 | SYSTOLIC BLOOD PRESSURE: 141 MMHG | RESPIRATION RATE: 17 BRPM

## 2025-06-12 DIAGNOSIS — D72.819 LEUKOPENIA, UNSPECIFIED TYPE: ICD-10-CM

## 2025-06-12 DIAGNOSIS — D69.6 THROMBOCYTOPENIA: ICD-10-CM

## 2025-06-12 PROCEDURE — 1126F AMNT PAIN NOTED NONE PRSNT: CPT | Performed by: PHYSICIAN ASSISTANT

## 2025-06-12 PROCEDURE — 99214 OFFICE O/P EST MOD 30 MIN: CPT | Performed by: PHYSICIAN ASSISTANT

## 2025-06-12 PROCEDURE — 1159F MED LIST DOCD IN RCRD: CPT | Performed by: PHYSICIAN ASSISTANT

## 2025-06-12 PROCEDURE — 1157F ADVNC CARE PLAN IN RCRD: CPT | Performed by: PHYSICIAN ASSISTANT

## 2025-06-12 PROCEDURE — 1160F RVW MEDS BY RX/DR IN RCRD: CPT | Performed by: PHYSICIAN ASSISTANT

## 2025-06-12 ASSESSMENT — PAIN SCALES - GENERAL: PAINLEVEL_OUTOF10: 0-NO PAIN

## 2025-06-12 NOTE — PROGRESS NOTES
"Patient ID: Timbo Jean Baptiste \"Saul\" is a 69 y.o. male.  Referring Physician: Shankar De Leon PA-C  [id number] [place] [address]  Primary Care Provider: Isaiah Huntley MD  Visit Type: Follow Up    Location: [facility]  Diagnosis/Reason:   Thrombocytopenia (Likely Fatty Infitration of Liver vs ITP)  Leukopenia (B12 def vs Chronic Benign vs Fatty Infiltration of Liver)    Interval Hx:  6/12/25  Timbo Jean Baptiste is a 69 y.o. male following up today for thrombocytopenia (likely fatty infiltration of liver vs ITP) and leukopenia (likely B12 def vs chronic benign vs fatty infiltration)     NOTE:  7/3/24 - Patient has medical history significant for chronic stasis dermatitis, GERD.    Patient denies weight loss, abnormal bruising and bleeding, hematuria, blood in stool, dark/black stools, epistaxis, oral/gingival bleeding, lymphadenopathy, recurrent infections, recurrent fevers, night sweats, early satiety, abdominal pain, bone pain, chest pain, palpitations, SOB, TRINIDAD, fatigue, dizziness, lightheadedness, PICA.    Relevant Hx:  7/3/24 - Initial Visit  Timbo Jean Baptiste is a 69 y.o. male referred for consultation of thrombocytopenia & leukopenia.     Per review of records:  Platelets low dating back to Aug of 2019 ranging from .   Leukocytopenia in Feb 2022 and 2024 - 4.3 and 3.8   RBCs, hemoglobin and hematocrit and differentials WNL   Hep C negative   CMP WNL     Previous Hematological Background:  Hx of hematological disorders: No - Patient denies prior hematologic history Told by PCP in the past, about 4-5 years ago that his platelets were low. Monitored- never worked up.    Hx of blood transfusions: No - Patient denies prior blood transfusion  Hx of iron supplementation: No - Denies any previous supplementation  Hx of B12 supplementation: No - Denies any prior supplementation  Hx of folate supplementation: No - Denies any prior supplementation    Relevant medications:  Omeprazole PPI only   Currently " using NSAID's (Naproxen, Ibuprofen etc.): Rarely  Currently taking any supplements: Denies    Feels well. No concerns. Some black stools periodically - recently has colonoscopy. Epitaxis from left nares in the winter - takes about 5 mins to clot, has has it cauterized in the past. No other bleeding. Has right upper abd cramping pain, describes as MSK pain, happens a couple times a week, relief with rest. He has had lymphedema in her bilateral lower extremities for years, has gone to PT, uses pumps and compression socks.     Patient denies weight loss, abnormal bruising and bleeding, hematuria, blood in stool, oral/gingival bleeding, lymphadenopathy, recurrent infections, recurrent fevers, night sweats, early satiety, abdominal pain, bone pain, chest pain, palpitations, SOB, TRINIDAD, fatigue, dizziness, lightheadedness, PICA.    PMHx:  Active Ambulatory Problems     Diagnosis Date Noted    Chronic stasis dermatitis 02/26/2024    GERD (gastroesophageal reflux disease) 02/26/2024    Lymphedema of both lower extremities 02/26/2024    Thrombocytopenia 02/26/2024    Hyperlipidemia 04/29/2024    Obesity, morbid (Multi) 03/03/2025     Resolved Ambulatory Problems     Diagnosis Date Noted    Hereditary lymphedema 02/26/2024    Body mass index (BMI) 40.0-44.9, adult (Multi) 02/26/2024    Hyperglycemia 04/29/2024    Edema of lower extremity 04/29/2024    Muscle weakness 04/29/2024    Pain of lower extremity 04/29/2024    Sensation of chest tightness 04/29/2024    Sore throat 04/29/2024    Stiffness of ankle joint 04/29/2024    Wheezing 04/29/2024     Past Medical History:   Diagnosis Date    Cataract 09/2022    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   Father: prostate cancer     Social Hx:  Timbo Jean Baptiste \"Saul\"    reports that he quit smoking about 24 years ago. His smoking use included cigarettes. He started smoking about 50 years ago. He has a 26 pack-year smoking history. He has never used smokeless tobacco.  He  reports current alcohol use of about 4.0 standard drinks of alcohol per week.  He  reports no history of drug use.  Social History     Socioeconomic History    Marital status:    Tobacco Use    Smoking status: Former     Current packs/day: 0.00     Average packs/day: 1 pack/day for 26.0 years (26.0 ttl pk-yrs)     Types: Cigarettes     Start date: 1974     Quit date: 2000     Years since quittin.8    Smokeless tobacco: Never   Vaping Use    Vaping status: Never Used   Substance and Sexual Activity    Alcohol use: Yes     Alcohol/week: 4.0 standard drinks of alcohol     Types: 4 Cans of beer per week    Drug use: Never    Sexual activity: Not Currently     Partners: Female     Birth control/protection: Abstinence      Living Situation: with spouse   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    ROS:  10 point review of systems negative except as state in HPI.    Vitals & Statistics:  Objective   BSA: There is no height or weight on file to calculate BSA.  There were no vitals taken for this visit.    Physical Exam:  Physical Exam  Vitals reviewed.   Constitutional:       General: He is not in acute distress.     Appearance: He is obese. He is not toxic-appearing.   HENT:      Head: Normocephalic and atraumatic.      Nose: Nose normal.      Mouth/Throat:      Mouth: Mucous membranes are moist.      Pharynx: Oropharynx is clear.   Eyes:      Pupils: Pupils are equal, round, and reactive to light.   Cardiovascular:      Rate and Rhythm: Normal rate and regular rhythm.      Pulses: Normal pulses.      Heart sounds: Normal heart sounds. No murmur heard.  Pulmonary:      Effort: Pulmonary effort is normal. No respiratory distress.      Breath sounds: Normal breath sounds.   Abdominal:      General: Bowel sounds are normal. There is no distension.      Palpations: Abdomen is soft. There is no mass.      Tenderness: There is no abdominal tenderness. There is no guarding.   Musculoskeletal:         General: Normal range of motion.      Cervical back: Normal range of motion and neck supple.      Right lower leg: Edema present.      Left lower leg: Edema present.   Lymphadenopathy:      Head:      Right side of head: No submental, submandibular, tonsillar, preauricular or posterior auricular adenopathy.      Left side of head: No submental, submandibular, tonsillar, preauricular or posterior auricular adenopathy.      Cervical: No cervical adenopathy.      Right cervical: No superficial or posterior cervical adenopathy.     Left cervical: No superficial or posterior cervical adenopathy.      Upper Body:      Right upper body: No supraclavicular or axillary adenopathy.      Left upper body: No supraclavicular or axillary  "adenopathy.   Skin:     General: Skin is warm and dry.      Capillary Refill: Capillary refill takes less than 2 seconds.   Neurological:      General: No focal deficit present.      Mental Status: He is alert and oriented to person, place, and time.   Psychiatric:         Mood and Affect: Mood normal.         Behavior: Behavior normal.         Thought Content: Thought content normal.         Judgment: Judgment normal.     Results:  Lab Results   Component Value Date    WBC 3.9 (L) 06/03/2025    NEUTROABS 2.34 06/03/2025    IGABSOL 0.02 06/03/2025    LYMPHSABS 0.94 (L) 06/03/2025    MONOSABS 0.46 06/03/2025    EOSABS 0.09 06/03/2025    BASOSABS 0.03 06/03/2025    RBC 4.45 (L) 06/03/2025    MCV 93 06/03/2025    MCHC 35.0 06/03/2025    HGB 14.4 06/03/2025    HCT 41.2 06/03/2025    PLT 83 (L) 06/03/2025     No results found for: \"RETICCTPCT\"   Lab Results   Component Value Date    CREATININE 1.24 12/05/2024    BUN 16 12/05/2024    EGFR 63 12/05/2024     12/05/2024    K 4.3 12/05/2024     12/05/2024    CO2 30 12/05/2024      Lab Results   Component Value Date    ALT 14 12/05/2024    AST 12 12/05/2024    ALKPHOS 64 12/05/2024    BILITOT 1.1 12/05/2024      Lab Results   Component Value Date    TSH 3.51 08/02/2019     Lab Results   Component Value Date    TSH 3.51 08/02/2019     Lab Results   Component Value Date    IRON 120 12/05/2024    TIBC 374 12/05/2024    FERRITIN 310 (H) 12/05/2024      Lab Results   Component Value Date    QUMZLCOR28 439 12/05/2024      Lab Results   Component Value Date    FOLATE 12.4 12/05/2024     Lab Results   Component Value Date    OLGA Negative 07/03/2024    RF <10 07/03/2024    SEDRATE 5 07/03/2024      Lab Results   Component Value Date    CRP 0.80 07/03/2024      No results found for: \"KAMI\"  Lab Results   Component Value Date     07/03/2024     Lab Results   Component Value Date    HAPTOGLOBIN 140 07/03/2024     Lab Results   Component Value Date    SPEP Normal. " 07/03/2024     Lab Results   Component Value Date     07/03/2024    IGM 48 07/03/2024    IGA 67 (L) 07/03/2024     Lab Results   Component Value Date    HEPBCIGM Nonreactive 07/03/2024    HEPBSAG Nonreactive 07/03/2024    HEPCAB Nonreactive 02/27/2024     Lab Results   Component Value Date    HIV1X2 Nonreactive 07/03/2024       Assessment:  Timbo Jean Baptiste is a 69 y.o. male following up today for hrombocytopenia (likely fatty infiltration of liver vs ITP) and leukopenia (likely B12 def vs chronic benign vs fatty infiltration)      He is asymptomatic w/ unremarkable physical exam    I reviewed patient's chart including but not limited to labs, imaging, surgical/procedure notes, pathology, hospital notes, doctor's notes.    6/3/25 results:  Leukopenia at 3.9 - Isolated lymphopenia at 0.94  NC/NC erythrocytopenia at 4.45 - H&H WNL, RDW WNL  Improved thrombocytopenia at 83K    Plan:  Discussed seeking medical attention if became sick d/t low wbc counts. To call us sooner for appt if starts getting B symptoms, recurrent infections or lumps.  Discussed possible etiologies of leukopenia including: vitamin deficiency, autoimmune disease, infection, inflammatory disorder, allergies, chronic benign leukopenia, malignancy, etc.   Discussed possible etiologies of thrombocytopenia including: vitamin deficiency, enlarged spleen/liver, autoimmune disease, infection, inflammatory disorder, allergies, collection tube clumping, etc. Will start hematological workup today.    1) Leukopenia - Most likely 2/2 Hepatic Steatosis  2) Thrombocytopenia - Most likely 2/2 Hepatic Steatosis & EtOH vs ITP  Stop Vitamin B12  No need for hematologic intervention at this time - Platelets > 30-50K, Wbc count & lymphocyte count improved w/ no new infections  Patient verbalizes preference to defer continued monitoring to patient's PCP moving forward and return to hematology should he need to do so in the future. D/t patient preference, we will  defer to PCP with recommendation to check CBC-D at least every 6 months and to have patient return to hematology should platelets decrease to 50K or less and leukopenia at 2.5 or lower.  Does not need to continue to follow with me at this time but am more than happy to see the patient in the future if needed.    I had an extensive discussion with the patient regarding the diagnosis and discussed the plan of therapy, including general considerations regarding side effects and outcomes. Pt understood and gave appropriate teach back about the plan of care. All questions were answered to the patient's satisfaction. The patient is instructed to contact us at any time if questions or problems arise. Thank you for the opportunity to participate in the care of this very pleasant patient.    Total time = 30 minutes. 50% or more of this time was spent in counseling and/or coordination of care including reviewing medical history/radiology/labs, examining patient, formulating outlined plan with team, and discussing plan with patient/family.    Shankar De Leon PA-C

## 2025-09-02 ENCOUNTER — APPOINTMENT (OUTPATIENT)
Dept: DERMATOLOGY | Facility: CLINIC | Age: 70
End: 2025-09-02
Payer: MEDICARE

## 2026-03-09 ENCOUNTER — APPOINTMENT (OUTPATIENT)
Dept: PRIMARY CARE | Facility: CLINIC | Age: 71
End: 2026-03-09
Payer: MEDICARE